# Patient Record
Sex: MALE | Race: WHITE | Employment: FULL TIME | URBAN - METROPOLITAN AREA
[De-identification: names, ages, dates, MRNs, and addresses within clinical notes are randomized per-mention and may not be internally consistent; named-entity substitution may affect disease eponyms.]

---

## 2021-12-15 ENCOUNTER — OFFICE VISIT (OUTPATIENT)
Dept: ENT CLINIC | Age: 36
End: 2021-12-15
Payer: COMMERCIAL

## 2021-12-15 VITALS
HEIGHT: 65 IN | TEMPERATURE: 97.2 F | HEART RATE: 90 BPM | WEIGHT: 94 LBS | SYSTOLIC BLOOD PRESSURE: 130 MMHG | BODY MASS INDEX: 15.66 KG/M2 | DIASTOLIC BLOOD PRESSURE: 82 MMHG

## 2021-12-15 DIAGNOSIS — H83.09 ACUTE VIRAL LABYRINTHITIS, UNSPECIFIED LATERALITY: ICD-10-CM

## 2021-12-15 DIAGNOSIS — H81.319 AUDITORY VERTIGO, UNSPECIFIED LATERALITY: Primary | ICD-10-CM

## 2021-12-15 PROCEDURE — G8484 FLU IMMUNIZE NO ADMIN: HCPCS | Performed by: OTOLARYNGOLOGY

## 2021-12-15 PROCEDURE — 99203 OFFICE O/P NEW LOW 30 MIN: CPT | Performed by: OTOLARYNGOLOGY

## 2021-12-15 PROCEDURE — 1036F TOBACCO NON-USER: CPT | Performed by: OTOLARYNGOLOGY

## 2021-12-15 PROCEDURE — G8419 CALC BMI OUT NRM PARAM NOF/U: HCPCS | Performed by: OTOLARYNGOLOGY

## 2021-12-15 PROCEDURE — G8427 DOCREV CUR MEDS BY ELIG CLIN: HCPCS | Performed by: OTOLARYNGOLOGY

## 2021-12-15 RX ORDER — CEFUROXIME AXETIL 250 MG/1
250 TABLET ORAL 2 TIMES DAILY
COMMUNITY
End: 2021-12-29

## 2021-12-15 RX ORDER — MECLIZINE HYDROCHLORIDE 25 MG/1
25 TABLET ORAL 3 TIMES DAILY PRN
COMMUNITY
End: 2021-12-29

## 2021-12-15 RX ORDER — PROMETHAZINE HYDROCHLORIDE 25 MG/1
25 TABLET ORAL EVERY 6 HOURS PRN
COMMUNITY
End: 2021-12-29

## 2021-12-15 NOTE — PROGRESS NOTES
CHIEF COMPLAINT: Dizziness    HISTORY OF PRESENT ILLNESS:  39 y.o. male who presents with dizziness which started 10 days ago. Woke up with it. True vertigo with nausea and vomiting. A little tinnitus right ear. No hearing loss or fullness. Resolving gradually. Seen in the emergency room and central nervous system disease has been ruled out. PAST MEDICAL HISTORY:   Social History     Tobacco Use   Smoking Status Never Smoker   Smokeless Tobacco Never Used                                                    Social History     Substance and Sexual Activity   Alcohol Use None                                                    Current Outpatient Medications:     meclizine (ANTIVERT) 25 MG tablet, Take 25 mg by mouth 3 times daily as needed, Disp: , Rfl:     cefUROXime (CEFTIN) 250 MG tablet, Take 250 mg by mouth 2 times daily, Disp: , Rfl:     Emtricitabine-Tenofovir AF (DESCOVY PO), Take by mouth, Disp: , Rfl:     promethazine (PHENERGAN) 25 MG tablet, Take 25 mg by mouth every 6 hours as needed for Nausea (Patient not taking: Reported on 12/15/2021), Disp: , Rfl:                                                History reviewed. No pertinent past medical history. History reviewed. No pertinent surgical history. FAMILY HISTORY: Family history reviewed. Except as noted in history of present illness, there is no pertinent family history      REVIEW OF SYSTEMS:  All pertinent positive and negative review of systems included in HPI. Otherwise, all systems are reviewed and negative.     PHYSICAL EXAMINATION:   GENERAL: wdwn- no acute distress  RESPIRATORY:  No stridor or respiratory distress  COMMUNICATION :  Normal voice  MENTAL STATUS:  Mood and affect normal, oriented X 3  HEAD AND FACE:  No abnormalities of the skin of face or head  EXTERNAL EARS AND NOSE:  Normal pinnae bilateral  FACIAL MUSCLES:  All branches of facial nerve intact  EXTRAOCULAR MUSCLES: Intact with full range of motion  FACE PALPATION:  No tenderness over sinuses. Zygomatic arches and orbital rims intact  OTOSCOPY:  Normal external auditory canals, tympanic membranes, and middle ear spaces  TUNING FORKS: Rinne ++ Fernandez midline at 512 Hz  INTRANASAL:  Septum midline, turbinates normal, meati clear. LIPS, TEETH, GINGIVA:  Normal mucosa  PHARYNX:  Normal  NECK:  No masses. LYMPHATIC:  No cervical adenopathy  SALIVARY GLANDS:  No swelling or masses in the parotid or submandibular salivary glands  THYROID:  No goiter or thyroid masses. IMPRESSION: Symptoms are typical of viral labyrinthitis. PLAN: Has been given a prescription for meclizine 25 mg which has been effective in controlling his symptoms. Vies that his symptoms will continue to resolve that he should use meclizine as necessary for symptom control. FOLLOW-UP: As needed.

## 2021-12-15 NOTE — LETTER
[address] [address]  Phone: [phone]  Fax: [phone]    Delfino Vogt MD        December 15, 2021     Patient: Doss Schilder   YOB: 1985   Date of Visit: 12/15/2021       To Whom It May Concern: Doss Schilder was seen in my clinic on 12/15/2021. Please excuse him from work from 12/10/2021 to 12/15/2021. If you have any questions or concerns, please don't hesitate to call.     Sincerely,        Delfino Vogt MD

## 2021-12-29 ENCOUNTER — TELEMEDICINE (OUTPATIENT)
Dept: INTERNAL MEDICINE CLINIC | Age: 36
End: 2021-12-29
Payer: COMMERCIAL

## 2021-12-29 DIAGNOSIS — J06.9 VIRAL URI: Primary | ICD-10-CM

## 2021-12-29 DIAGNOSIS — R31.9 HEMATURIA, UNSPECIFIED TYPE: ICD-10-CM

## 2021-12-29 PROCEDURE — 99203 OFFICE O/P NEW LOW 30 MIN: CPT | Performed by: INTERNAL MEDICINE

## 2021-12-29 PROCEDURE — G8427 DOCREV CUR MEDS BY ELIG CLIN: HCPCS | Performed by: INTERNAL MEDICINE

## 2021-12-29 SDOH — ECONOMIC STABILITY: TRANSPORTATION INSECURITY
IN THE PAST 12 MONTHS, HAS LACK OF TRANSPORTATION KEPT YOU FROM MEETINGS, WORK, OR FROM GETTING THINGS NEEDED FOR DAILY LIVING?: NO

## 2021-12-29 SDOH — ECONOMIC STABILITY: HOUSING INSECURITY
IN THE LAST 12 MONTHS, WAS THERE A TIME WHEN YOU DID NOT HAVE A STEADY PLACE TO SLEEP OR SLEPT IN A SHELTER (INCLUDING NOW)?: NO

## 2021-12-29 SDOH — HEALTH STABILITY: PHYSICAL HEALTH: ON AVERAGE, HOW MANY MINUTES DO YOU ENGAGE IN EXERCISE AT THIS LEVEL?: 0 MIN

## 2021-12-29 SDOH — ECONOMIC STABILITY: FOOD INSECURITY: WITHIN THE PAST 12 MONTHS, THE FOOD YOU BOUGHT JUST DIDN'T LAST AND YOU DIDN'T HAVE MONEY TO GET MORE.: NEVER TRUE

## 2021-12-29 SDOH — ECONOMIC STABILITY: TRANSPORTATION INSECURITY
IN THE PAST 12 MONTHS, HAS THE LACK OF TRANSPORTATION KEPT YOU FROM MEDICAL APPOINTMENTS OR FROM GETTING MEDICATIONS?: NO

## 2021-12-29 SDOH — ECONOMIC STABILITY: INCOME INSECURITY: IN THE LAST 12 MONTHS, WAS THERE A TIME WHEN YOU WERE NOT ABLE TO PAY THE MORTGAGE OR RENT ON TIME?: NO

## 2021-12-29 SDOH — ECONOMIC STABILITY: FOOD INSECURITY: WITHIN THE PAST 12 MONTHS, YOU WORRIED THAT YOUR FOOD WOULD RUN OUT BEFORE YOU GOT MONEY TO BUY MORE.: NEVER TRUE

## 2021-12-29 SDOH — HEALTH STABILITY: PHYSICAL HEALTH: ON AVERAGE, HOW MANY DAYS PER WEEK DO YOU ENGAGE IN MODERATE TO STRENUOUS EXERCISE (LIKE A BRISK WALK)?: 0 DAYS

## 2021-12-29 ASSESSMENT — SOCIAL DETERMINANTS OF HEALTH (SDOH)
WITHIN THE LAST YEAR, HAVE YOU BEEN AFRAID OF YOUR PARTNER OR EX-PARTNER?: NO
DO YOU BELONG TO ANY CLUBS OR ORGANIZATIONS SUCH AS CHURCH GROUPS UNIONS, FRATERNAL OR ATHLETIC GROUPS, OR SCHOOL GROUPS?: NO
IN A TYPICAL WEEK, HOW MANY TIMES DO YOU TALK ON THE PHONE WITH FAMILY, FRIENDS, OR NEIGHBORS?: THREE TIMES A WEEK
HOW OFTEN DO YOU GET TOGETHER WITH FRIENDS OR RELATIVES?: THREE TIMES A WEEK
WITHIN THE LAST YEAR, HAVE TO BEEN RAPED OR FORCED TO HAVE ANY KIND OF SEXUAL ACTIVITY BY YOUR PARTNER OR EX-PARTNER?: NO
WITHIN THE LAST YEAR, HAVE YOU BEEN KICKED, HIT, SLAPPED, OR OTHERWISE PHYSICALLY HURT BY YOUR PARTNER OR EX-PARTNER?: NO
HOW HARD IS IT FOR YOU TO PAY FOR THE VERY BASICS LIKE FOOD, HOUSING, MEDICAL CARE, AND HEATING?: NOT HARD AT ALL
WITHIN THE LAST YEAR, HAVE YOU BEEN HUMILIATED OR EMOTIONALLY ABUSED IN OTHER WAYS BY YOUR PARTNER OR EX-PARTNER?: NO
HOW OFTEN DO YOU ATTENT MEETINGS OF THE CLUB OR ORGANIZATION YOU BELONG TO?: NEVER
HOW OFTEN DO YOU ATTEND CHURCH OR RELIGIOUS SERVICES?: NEVER

## 2021-12-29 ASSESSMENT — LIFESTYLE VARIABLES: HOW OFTEN DO YOU HAVE A DRINK CONTAINING ALCOHOL: NEVER

## 2021-12-29 ASSESSMENT — PATIENT HEALTH QUESTIONNAIRE - PHQ9
SUM OF ALL RESPONSES TO PHQ QUESTIONS 1-9: 1
SUM OF ALL RESPONSES TO PHQ9 QUESTIONS 1 & 2: 1
2. FEELING DOWN, DEPRESSED OR HOPELESS: 1
SUM OF ALL RESPONSES TO PHQ QUESTIONS 1-9: 1
SUM OF ALL RESPONSES TO PHQ QUESTIONS 1-9: 1
1. LITTLE INTEREST OR PLEASURE IN DOING THINGS: 0

## 2021-12-29 NOTE — PROGRESS NOTES
Haritha Deshpande (:  1985) is a 39 y.o. male,New patient, here for evaluation of the following chief complaint(s): New Patient (persistant headache and sore throat. Patient was just in the ED for vertigo and urine issues. Also said that he has high sugar (138). )         ASSESSMENT/PLAN:  1. Viral URI  Patient with ongoing symptoms of viral URI with Covid exposure. Patient did have a Covid test yesterday which was negative. Patient to continue good mask wearing. Can continue over-the-counter medications for symptom management including Tylenol and ibuprofen. Patient to notify me if symptoms do not completely resolved. 2. Hematuria, unspecified type  Noted on lab work in the ER in mid December. Patient may have had a kidney stone at that time.    -Will check UA when patient is seen in the office in a couple of weeks-  -Referral given to urology  -     Irvin More MD, Urology, [place]  3.  Elevated blood sugar  Patient notes an elevated blood sugar when in the ER while fasting. Patient would like evaluation of this further.  - will plan on CMP and hemoglobin A1c when patient is seen in the office in 2 weeks  Return in about 2 weeks (around 2022), or annual physical.       SUBJECTIVE/OBJECTIVE:  HPI     Patient is a 43-year-old male with no significant past medical history who presents secondary to headache sore throat nasal congestion as well as follow-up from an ER visit. Patient is a new patient and will require a full new patient visit however secondary to his symptoms we will see him for acute issues at this time. Patient has he was exposed to Covid on the . He went down to [place] following this and noticed symptoms 4 to 5 days ago. He notes he has a headache sore throat nasal congestion. He denies any cough or fevers. He completed a rapid test yesterday at urgent care which was negative. His symptoms are not severe.   He notes that the most significant symptom is actually fatigue. He was not tested for flu at urgent care. He has not had the COVID-19 vaccine or the flu vaccine. Patient also notes that he was recently in the ER twice on December 6 and December 11. He originally went there secondary to vertigo. He saw ENT following this visit. He notes that symptoms of vertigo have improved significantly. During both ER visits he notes that he was also found to initially possibly have a UTI. He was given antibiotics at his first ER visit. He went back to the ER the second time secondary to passing a large blood clot through the urethra. Urine at that time did show blood. It was thought that he could possibly have had a kidney stone that he passed. Patient notes that his urine is dark however he cannot see any blood currently. Review of Systems ROS negative except for those noted in the HPI above.        Patient-Reported Vitals 12/29/2021   Patient-Reported Weight 94   Patient-Reported Height 5'5\"      Physical Exam    [INSTRUCTIONS:  \"[x]\" Indicates a positive item  \"[]\" Indicates a negative item  -- DELETE ALL ITEMS NOT EXAMINED]    Constitutional: [x] Appears well-developed and well-nourished [x] No apparent distress      [] Abnormal -     Mental status: [x] Alert and awake  [x] Oriented to person/place/time [x] Able to follow commands    [] Abnormal -     Eyes:   EOM    [x]  Normal    [] Abnormal -   Sclera  [x]  Normal    [] Abnormal -          Discharge [x]  None visible   [] Abnormal -     HENT: [x] Normocephalic, atraumatic  [] Abnormal -   [x] Mouth/Throat: Mucous membranes are moist    External Ears [x] Normal  [] Abnormal -    Neck: [x] No visualized mass [] Abnormal -     Pulmonary/Chest: [x] Respiratory effort normal   [x] No visualized signs of difficulty breathing or respiratory distress        [] Abnormal -      Musculoskeletal:   [] Normal gait with no signs of ataxia         [x] Normal range of motion of neck        [] Abnormal -

## 2021-12-29 NOTE — PATIENT INSTRUCTIONS
[facility]  [address] [facility]  Phone: [phone]  Fax: [phone]

## 2022-01-13 ENCOUNTER — TELEPHONE (OUTPATIENT)
Dept: INTERNAL MEDICINE CLINIC | Age: 37
End: 2022-01-13

## 2022-02-07 LAB
ALBUMIN SERPL-MCNC: 5 G/DL
ALP BLD-CCNC: 91 U/L
ALT SERPL-CCNC: 14 U/L
ANION GAP SERPL CALCULATED.3IONS-SCNC: NORMAL MMOL/L
ANTIGEN INTERPRETATION: NEGATIVE
AST SERPL-CCNC: 18 U/L
BASOPHILS ABSOLUTE: 0 /ΜL
BASOPHILS RELATIVE PERCENT: 0 %
BILIRUB SERPL-MCNC: 0.4 MG/DL (ref 0.1–1.4)
BUN BLDV-MCNC: 7 MG/DL
CALCIUM SERPL-MCNC: 9.7 MG/DL
CHLORIDE BLD-SCNC: 104 MMOL/L
CO2: 23 MMOL/L
CREAT SERPL-MCNC: 0.88 MG/DL
EOSINOPHILS ABSOLUTE: 0.1 /ΜL
EOSINOPHILS RELATIVE PERCENT: 3 %
GFR CALCULATED: NORMAL
GLUCOSE BLD-MCNC: 96 MG/DL
HCT VFR BLD CALC: 39 % (ref 41–53)
HEMOGLOBIN: 13.6 G/DL (ref 13.5–17.5)
HIV AG/AB: NONREACTIVE
LYMPHOCYTES ABSOLUTE: 2.8 /ΜL
LYMPHOCYTES RELATIVE PERCENT: 61 %
MCH RBC QN AUTO: 34.1 PG
MCHC RBC AUTO-ENTMCNC: 34.9 G/DL
MCV RBC AUTO: 98 FL
MONOCYTES ABSOLUTE: 0.6 /ΜL
MONOCYTES RELATIVE PERCENT: 12 %
NEUTROPHILS ABSOLUTE: 1.1 /ΜL
NEUTROPHILS RELATIVE PERCENT: 24 %
PLATELET # BLD: 176 K/ΜL
PMV BLD AUTO: ABNORMAL FL
POTASSIUM SERPL-SCNC: 4.1 MMOL/L
RBC # BLD: 3.99 10^6/ΜL
SODIUM BLD-SCNC: 143 MMOL/L
TOTAL PROTEIN: 7.2
WBC # BLD: 4.6 10^3/ML

## 2022-02-09 ENCOUNTER — OFFICE VISIT (OUTPATIENT)
Dept: INTERNAL MEDICINE CLINIC | Age: 37
End: 2022-02-09
Payer: COMMERCIAL

## 2022-02-09 VITALS
BODY MASS INDEX: 16.16 KG/M2 | WEIGHT: 97 LBS | DIASTOLIC BLOOD PRESSURE: 64 MMHG | SYSTOLIC BLOOD PRESSURE: 104 MMHG | HEIGHT: 65 IN | HEART RATE: 74 BPM | OXYGEN SATURATION: 96 %

## 2022-02-09 DIAGNOSIS — Z00.00 ANNUAL PHYSICAL EXAM: Primary | ICD-10-CM

## 2022-02-09 DIAGNOSIS — F33.1 MODERATE EPISODE OF RECURRENT MAJOR DEPRESSIVE DISORDER (HCC): ICD-10-CM

## 2022-02-09 DIAGNOSIS — R63.6 UNDERWEIGHT: ICD-10-CM

## 2022-02-09 DIAGNOSIS — R19.7 DIARRHEA, UNSPECIFIED TYPE: ICD-10-CM

## 2022-02-09 DIAGNOSIS — K40.20 NON-RECURRENT BILATERAL INGUINAL HERNIA WITHOUT OBSTRUCTION OR GANGRENE: ICD-10-CM

## 2022-02-09 DIAGNOSIS — R63.4 WEIGHT LOSS: ICD-10-CM

## 2022-02-09 DIAGNOSIS — F41.9 ANXIETY: ICD-10-CM

## 2022-02-09 DIAGNOSIS — R73.9 ELEVATED BLOOD SUGAR: ICD-10-CM

## 2022-02-09 PROCEDURE — 99395 PREV VISIT EST AGE 18-39: CPT | Performed by: INTERNAL MEDICINE

## 2022-02-09 RX ORDER — ESCITALOPRAM OXALATE 5 MG/1
5 TABLET ORAL DAILY
Qty: 30 TABLET | Refills: 0 | Status: SHIPPED | OUTPATIENT
Start: 2022-02-09 | End: 2022-06-27

## 2022-02-09 NOTE — PROGRESS NOTES
Nanci Christie (:  1985) is a 39 y.o. male,Established patient, here for evaluation of the following chief complaint(s): Annual Exam and Depression (Feels emotional support dog would help)      Vitals:    22 1044   BP: 104/64   Pulse: 74   SpO2: 96%        ASSESSMENT/PLAN:  1. Annual physical exam  Tetanus shot: will call to schedule  Covid-19 vaccine: education given  HIV: [facility]  Hep c: lab [facility]    Seat Belt use: yes  Smoke and carbon monoxide detectors within the home: yes  Clyman Cornet screen use daily: No, education given   Diet: no longer vegan, but is maintaining a plant based diet. Going to eat meat again soon secondary to low weights. Stays away from fried foods. Does get fruit and veggies  Exercise : not doing any at this time    2. Non-recurrent bilateral inguinal hernia without obstruction or gangrene  Seen on exam today. Referral given to general surgery for further evaluation.  -     Dave Velasco MD, General Surgery, [facility]  3. Elevated blood sugar  Patient with elevated blood sugar while fasting on prior labs. Patient concerned about diabetes. Check hemoglobin A1c  -     Hemoglobin A1C; Future  4. Weight loss  Patient has had significant weight loss and BMI is currently 16.14. Patient has noted that this has been an issue for some time. Check TSH in addition to obtaining labs from [facility] that were done recently. These included a CMP, CBC, HIV and hepatitis C. Patient also notes diarrhea following eating. Patient had work-up by GI in the past including screening for celiac disease and IBD. Will give referral back to GI for further evaluation. Patient did not have a clean colonoscopy most recently. Some polyps were still identified during that colonoscopy. Patient also scheduled with dietitian  -     TSH with Reflex; Future  -     [facility] - Jeremi Campbell MD, Gastroenterology, [facility]  5. Diarrhea, unspecified type  See #4 above  6.  Anxiety  Patient has worsening anxiety and depression more recently around the death of his grandmother. Patient would like to have an emotional support dog. Letter written for patient to take to his landlord. Patient does not want to pursue medication at this time. Plan to follow-up in 4 to 6 weeks when patient has insurance. 7. Moderate episode of recurrent major depressive disorder ([facility] [address]  See #6 above  8. Underweight  See #4 above    Return in about 4 weeks (around 3/9/2022). SUBJECTIVE/OBJECTIVE:  HPI    Annual physical    Tetanus shot: will call to schedule  Covid-19 vaccine: education given  HIV: [facility]  Hep c: lab core    Seat Belt use: yes  Smoke and carbon monoxide detectors within the home: yes  [employer] screen use daily: No, education given   Diet: no longer vegan, but is maintaining a plant based diet. Going to eat meat again soon secondary to low weights. Stays away from fried foods. Does get fruit and veggies  Exercise : not doing any at this time    Patient has had is having a lot of anxiety and depression. He has had this in the past and has been on medications in the past.  Patient notes that stress has increased most recently secondary to the death of his grandmother. He also notes financial issues. He is also in a new city without any family or many friends. He notes difficulty making friends. Patient would like an emotional support animal with possible. Patient denies any suicidal ideation or self-harm thoughts. Patient also notes that he had labs completed by his infectious disease doctor who prescribes him prep by [facility].  Patient shows me results for an HIV and hepatitis C test as well as CMP and CBC. Will obtain these labs. Patient also notes that he has dealt with low weight for some time. He states that he seen GI in the past with multiple colonoscopies. Most recent colonoscopy was not a good prep. There were polyps that were found.   Patient has had work-up for celiac disease that he states was negative. He notes that he has no problem eating however every time he eats he notes diarrhea. Denies any blood or melena. Has not seen GI recently. Patient also notes inguinal hernias in the bilateral groin areas. He notes the right side does give him discomfort. Review of Systems ROS negative except for those noted in the HPI above. Vitals:    02/09/22 1044   BP: 104/64   Pulse: 74   SpO2: 96%     Physical Exam  Constitutional:       General: He is not in acute distress. Appearance: Normal appearance. He is not ill-appearing. HENT:      Head: Normocephalic and atraumatic. Eyes:      General: No scleral icterus. Extraocular Movements: Extraocular movements intact. Conjunctiva/sclera: Conjunctivae normal.   Cardiovascular:      Rate and Rhythm: Normal rate and regular rhythm. Pulses: Normal pulses. Heart sounds: No murmur heard. No friction rub. No gallop. Pulmonary:      Effort: Pulmonary effort is normal. No respiratory distress. Breath sounds: No wheezing, rhonchi or rales. Abdominal:      General: Bowel sounds are normal. There is no distension. Palpations: Abdomen is soft. Tenderness: There is no abdominal tenderness. There is no guarding or rebound. Genitourinary:     Comments: Inguinal hernias palpated bilaterally  Musculoskeletal:      Cervical back: Normal range of motion. No muscular tenderness. Right lower leg: No edema. Left lower leg: No edema. Lymphadenopathy:      Cervical: No cervical adenopathy. Skin:     General: Skin is warm. Findings: No erythema or rash. Neurological:      General: No focal deficit present. Mental Status: He is alert and oriented to person, place, and time. Psychiatric:         Mood and Affect: Mood normal.         Behavior: Behavior normal.           An electronic signature was used to authenticate this note.     --Aly Shah DO

## 2022-02-09 NOTE — LETTER
To whom it may concern,      It is in my medical opinion that my patient, Cole Parker, would benefit from an emotional support animal such as a dog. If there are any questions please feel free to reach out to me.         Ada Tatum, DO  Internal Medicine  [facility]  Q:[phone]

## 2022-02-10 PROBLEM — F41.9 ANXIETY: Status: ACTIVE | Noted: 2022-02-10

## 2022-02-10 PROBLEM — F33.1 MODERATE EPISODE OF RECURRENT MAJOR DEPRESSIVE DISORDER (HCC): Status: ACTIVE | Noted: 2022-02-10

## 2022-02-10 PROBLEM — R63.6 UNDERWEIGHT: Status: ACTIVE | Noted: 2022-02-10

## 2022-02-22 ENCOUNTER — TELEPHONE (OUTPATIENT)
Dept: SURGERY | Age: 37
End: 2022-02-22

## 2022-02-22 NOTE — TELEPHONE ENCOUNTER
Left message for patient to call back to schedule K40.20 (ICD-10-CM) - Non-recurrent bilateral inguinal hernia without obstruction or gangrene    Referral work que

## 2022-05-02 ENCOUNTER — TELEPHONE (OUTPATIENT)
Dept: INTERNAL MEDICINE CLINIC | Age: 37
End: 2022-05-02

## 2022-05-02 LAB
ALBUMIN SERPL-MCNC: 4.7 G/DL
ALP BLD-CCNC: 85 U/L
ALT SERPL-CCNC: 13 U/L
ANION GAP SERPL CALCULATED.3IONS-SCNC: NORMAL MMOL/L
ANTIGEN INTERPRETATION: NEGATIVE
AST SERPL-CCNC: 15 U/L
BASOPHILS ABSOLUTE: 0 /ΜL
BASOPHILS RELATIVE PERCENT: 1 %
BILIRUB SERPL-MCNC: 0.6 MG/DL (ref 0.1–1.4)
BUN BLDV-MCNC: 13 MG/DL
CALCIUM SERPL-MCNC: 9.4 MG/DL
CHLORIDE BLD-SCNC: 101 MMOL/L
CO2: 24 MMOL/L
CREAT SERPL-MCNC: 0.96 MG/DL
EOSINOPHILS ABSOLUTE: 0.1 /ΜL
EOSINOPHILS RELATIVE PERCENT: 3 %
GFR CALCULATED: NORMAL
GLUCOSE BLD-MCNC: 98 MG/DL
HCT VFR BLD CALC: 42.6 % (ref 41–53)
HEMOGLOBIN: 14.7 G/DL (ref 13.5–17.5)
HIV AG/AB: NONREACTIVE
LYMPHOCYTES ABSOLUTE: 2.4 /ΜL
LYMPHOCYTES RELATIVE PERCENT: 57 %
MCH RBC QN AUTO: 34.2 PG
MCHC RBC AUTO-ENTMCNC: 34.5 G/DL
MCV RBC AUTO: 99 FL
MONOCYTES ABSOLUTE: 0.5 /ΜL
MONOCYTES RELATIVE PERCENT: 11 %
NEUTROPHILS ABSOLUTE: 1.2 /ΜL
NEUTROPHILS RELATIVE PERCENT: 28 %
PLATELET # BLD: 181 K/ΜL
PMV BLD AUTO: NORMAL FL
POTASSIUM SERPL-SCNC: 3.9 MMOL/L
RBC # BLD: 4.3 10^6/ΜL
RPR TITER: NORMAL
RPR: REACTIVE
SODIUM BLD-SCNC: 140 MMOL/L
TOTAL PROTEIN: 7.1
WBC # BLD: 4.3 10^3/ML

## 2022-05-02 NOTE — TELEPHONE ENCOUNTER
----- Message from David Holland sent at 5/2/2022  4:28 PM EDT -----  Subject: Message to Provider    QUESTIONS  Information for Provider? PT called requesting Copy of referral sent to   Dr. Whitney Ortiz of [facility]. Fax# [phone]. Also wanted to let Dr. Norma Quiñones know talked to Dr. Leonel Grant about procedure in office, but due to   insurance, they are getting a quote and calling him back. GI appt, Dr. Garry King, & Surgeon appt, Dr. Shyla Rahman, are both scheduled for 5/16.  ---------------------------------------------------------------------------  --------------  CALL BACK INFO  What is the best way for the office to contact you? OK to leave message on   voicemail  Preferred Call Back Phone Number? [phone]  ---------------------------------------------------------------------------  --------------  SCRIPT ANSWERS  Relationship to Patient?  Self

## 2022-05-16 ENCOUNTER — OFFICE VISIT (OUTPATIENT)
Dept: SURGERY | Age: 37
End: 2022-05-16
Payer: COMMERCIAL

## 2022-05-16 VITALS
HEART RATE: 81 BPM | HEIGHT: 65 IN | WEIGHT: 98.8 LBS | SYSTOLIC BLOOD PRESSURE: 101 MMHG | DIASTOLIC BLOOD PRESSURE: 67 MMHG | BODY MASS INDEX: 16.46 KG/M2 | TEMPERATURE: 98.4 F

## 2022-05-16 DIAGNOSIS — K40.20 NON-RECURRENT BILATERAL INGUINAL HERNIA WITHOUT OBSTRUCTION OR GANGRENE: Primary | ICD-10-CM

## 2022-05-16 PROCEDURE — 99204 OFFICE O/P NEW MOD 45 MIN: CPT | Performed by: SURGERY

## 2022-05-16 NOTE — PROGRESS NOTES
Department of General Surgery - Adult  General Surgery Service  Resident Clinic Note      CC: Bilateral Inguinal Hernia    History Obtained From:  patient, electronic medical record    HISTORY OF PRESENT ILLNESS:  This is a 39 y.o. male with Hx as below who presents for evaluation of inguinal hernias. He states that they have been present for at least the past year. They do cause him some discomfort on a daily basis. He denies episodes of incarceration and denies obstructive symptoms. Surgical history is significant for left orchiectomy for undescended testicle at age 15. Patient is currently undergoing work up for hematuria and is scheduled for a pyelogram with urology in the upcoming weeks. He endorses a strong family history of colon cancer and has had polyps removed on previous colonoscopies. No past medical history on file. No past surgical history on file. Current Outpatient Medications   Medication Sig Dispense Refill    Probiotic Product (PROBIOTIC PO) Take by mouth daily      VITAMIN D PO Take by mouth daily      Omega-3 Fatty Acids (OMEGA 3 PO) Take by mouth daily      escitalopram (LEXAPRO) 5 MG tablet Take 1 tablet by mouth daily 30 tablet 0    Emtricitabine-Tenofovir AF (DESCOVY PO) Take by mouth       No current facility-administered medications for this visit. No Known Allergies  Social History     Tobacco Use    Smoking status: Never Smoker    Smokeless tobacco: Never Used   Vaping Use    Vaping Use: Never used   Substance Use Topics    Alcohol use: Not Currently    Drug use: Not Currently     No family history on file. REVIEW OF SYSTEMS:    A 14 point review of systems was conducted, significant findings as noted in HPI. All other systems negative. PHYSICAL EXAM:    There were no vitals filed for this visit.     General appearance: thin male, sitting in chair, in NAD  Eyes: EOMI, no scleral icterus  Neck: trachea midline, no JVD  Chest/Lungs: normal effort, no adventitious breath sounds, on RA  Cardiovascular: RRR  Abdomen: soft, non-tender, non-distended, +BS, no guarding/rigidity  : bilateral inguinal hernias noted, easily reducible, absent left testicle  Skin: warm and dry, no rashes  Extremities: no edema, no cyanosis  Neuro: A&Ox3, no focal deficits, sensation intact    ASSESSMENT AND PLAN:  This is a 39 y.o. male who presents with bilateral symptomatic inguinal hernias    - Given symptomatic nature and size of hernias, the patient was offered surgical repair, specifically laparoscopic pre-peritoneal repair with mesh  - after discussion of indications, risks, benefits, and alternatives to surgery , the patient wishes to proceed  - plan for OR in the near future, patient will call to schedule      Ciera Santana DO  05/16/22  9:36 AM

## 2022-05-16 NOTE — LETTER
[facility] Sur[facility] of Kareen Champion M.D. [facility]  [id number]  [address] [facility]. [address]  Phone: [phone] Fax: [phone]            Surgery Order/Time:  22/2:45 PM  Conf. # _________________  Scheduled by: Surinder Lobo Lpn                                **Pre-Surgical Orders in [facility]**  Facility: [facility].    Surgery Date: 2022 Time: 730am     Pt arrival: [place]  Second Surgeon: N/A        Patient Name: Corinne Clarity  : 1985  Home Ph:[phone] (home)  Address:[address] [place] [address]  PCP:  Blue Tovar, DO   Does patient speak English?: yes    needed? no                                             PROCEDURE: Laparoscopic bilateral inguinal hernia repair  CPT: 93165: Laparoscopic Inguinal Hernia Repair    DIAGNOSIS:      ICD-10-CM    1. Non-recurrent bilateral inguinal hernia without obstruction or gangrene  K40.20        Anesthesia: General  Time Needed:  1 hour   Pt Position:  supine      Outpatient __x__ Admit ______  Assist._____   Cardiac Clearance: no  Patient to meet with Anesthesiology prior to surgery: no    Patient Allergies: No Known Allergies  Pre-Op H&P to be done by: Blue Tovar DO  Pre-Op Antibiotics: Ancef: 2g IV On Call to OR      Physician: Noelle Olson MD Date: 22             Insurance:     ID #      Ph #   Date called ________________   Laina Agosto to: _____________ Precert Needed?  Yes  /  No  PreAuth # & Details   ______________________________________________________________________

## 2022-05-17 ENCOUNTER — TELEPHONE (OUTPATIENT)
Dept: SURGERY | Age: 37
End: 2022-05-17

## 2022-05-17 NOTE — TELEPHONE ENCOUNTER
Patient stated that MD wanted him to schedule surgery after his cystoscopy on 6/14/22. Patient stated he would call to schedule after that was completed.

## 2022-05-31 LAB
BASOPHILS ABSOLUTE: 0 /ΜL
BASOPHILS RELATIVE PERCENT: 1 %
BUN BLDV-MCNC: 9 MG/DL
CALCIUM SERPL-MCNC: 9.4 MG/DL
CHLORIDE BLD-SCNC: 103 MMOL/L
CO2: 23 MMOL/L
CREAT SERPL-MCNC: 0.88 MG/DL
EOSINOPHILS ABSOLUTE: 0.1 /ΜL
EOSINOPHILS RELATIVE PERCENT: 2 %
GFR CALCULATED: NORMAL
GLUCOSE BLD-MCNC: 94 MG/DL
HCT VFR BLD CALC: 43.6 % (ref 41–53)
HEMOGLOBIN: 14.9 G/DL (ref 13.5–17.5)
LYMPHOCYTES ABSOLUTE: 2.1 /ΜL
LYMPHOCYTES RELATIVE PERCENT: 59 %
MCH RBC QN AUTO: 33.8 PG
MCHC RBC AUTO-ENTMCNC: 34.2 G/DL
MCV RBC AUTO: 99 FL
MONOCYTES ABSOLUTE: 0.4 /ΜL
MONOCYTES RELATIVE PERCENT: 10 %
NEUTROPHILS ABSOLUTE: 1 /ΜL
NEUTROPHILS RELATIVE PERCENT: 28 %
PLATELET # BLD: 181 K/ΜL
PMV BLD AUTO: NORMAL FL
POTASSIUM SERPL-SCNC: 4.2 MMOL/L
RBC # BLD: 4.41 10^6/ΜL
SODIUM BLD-SCNC: 141 MMOL/L
WBC # BLD: 3.6 10^3/ML

## 2022-06-02 LAB
BASOPHILS ABSOLUTE: 0 /ΜL
BASOPHILS RELATIVE PERCENT: 1 %
BUN BLDV-MCNC: 9 MG/DL
CALCIUM SERPL-MCNC: 9.4 MG/DL
CHLORIDE BLD-SCNC: 103 MMOL/L
CO2: 23 MMOL/L
CREAT SERPL-MCNC: 0.88 MG/DL
EOSINOPHILS ABSOLUTE: 0.1 /ΜL
EOSINOPHILS RELATIVE PERCENT: 2 %
GFR CALCULATED: 114
GLUCOSE BLD-MCNC: 94 MG/DL
HCT VFR BLD CALC: 43.6 % (ref 41–53)
HEMOGLOBIN: 14.9 G/DL (ref 13.5–17.5)
LYMPHOCYTES ABSOLUTE: 2.1 /ΜL
LYMPHOCYTES RELATIVE PERCENT: 59 %
MCH RBC QN AUTO: 33.8 PG
MCHC RBC AUTO-ENTMCNC: 34.2 G/DL
MCV RBC AUTO: 99 FL
MONOCYTES ABSOLUTE: 0.4 /ΜL
MONOCYTES RELATIVE PERCENT: 10 %
NEUTROPHILS ABSOLUTE: 1 /ΜL
NEUTROPHILS RELATIVE PERCENT: 28 %
PLATELET # BLD: 181 K/ΜL
PMV BLD AUTO: NORMAL FL
POTASSIUM SERPL-SCNC: 4.2 MMOL/L
RBC # BLD: 4.41 10^6/ΜL
SODIUM BLD-SCNC: 141 MMOL/L
WBC # BLD: 3.6 10^3/ML

## 2022-06-10 NOTE — PROGRESS NOTES
[facility] PRE-SURGICAL TESTING INSTRUCTIONS                                  PRIOR TO PROCEDURE DATE:        1. PLEASE FOLLOW ANY  GUIDELINES/ INSTRUCTIONS PRIOR TO YOUR PROCEDURE AS ADVISED BY YOUR SURGEON. 2. Arrange for someone to drive you home and be with you for the first 24 hours after discharge for your safety after your procedure for which you received sedation. Ensure it is someone we can share information with regarding your discharge. 3. You must contact your surgeon for instructions IF:   You are taking any blood thinners, aspirin, anti-inflammatory or vitamin E.   There is a change in your physical condition such as a cold, fever, rash, cuts, sores or any other infection, especially near your surgical site. 4. Do not drink alcohol the day before or day of your procedure. 5. A Pre-op History and Physical for surgery MUST be completed by your Physician or Urgent Care within 30 days of your procedure date. Please bring a copy with you on the day of your procedure and along with any other testing performed. THE DAY OF YOUR PROCEDURE:  1. Follow instructions for ARRIVAL TIME as DIRECTED BY YOUR SURGEON. 2. Enter the MAIN entrance from [employer] and follow the signs to the free [place] or [employer] parking (offered free of charge 6am-5pm). 3. Enter the Main Entrance of the hospital (do not enter from the lower level of the parking garage). Upon entrance, check in with the  at the main desk on your left. If no one is available at the desk, proceed into the [facility] Waiting Room and go through the door directly into the [facility]. There is a Check-in desk ACROSS from Room 5 (marked with a sign hanging from the ceiling). The phone number for the surgery center is [phone]. 4. Please call [phone] option #2 option #2 if you have not been preregistered yet.   On the day of your procedure bring your insurance card and photo ID. You will be registered at your bedside once brought back to your room. 5. DO NOT EAT ANYTHING eight hours prior to your arrival for surgery. May have 8 ounces of water 4 hours prior to your arrival for surgery. NOTE: ALL Gastric, Bariatric and Bowel surgery patients MUST follow their surgeon's instructions. 6. MEDICATIONS    Take the following medications with a SMALL sip of water:    Bariatric patient's call surgeon if on diabetic medications as some need to be stopped 1 week preop   Use your usual dose of inhalers the morning of surgery. BRING your rescue inhaler with you to hospital.    Anesthesia does NOT want you to take insulin the morning of surgery. They will control your blood sugar while you are at the hospital. Please contact your ordering physician for instructions regarding your insulin the night before your procedure. If you have an insulin pump, please keep it set on basal rate. 7. Do not swallow water when brushing teeth. No gum, candy, mints or ice chips. Refrain from smoking or at least decrease the amount. 8. Dress in loose, comfortable clothing appropriate for redressing after your procedure. Do not wear jewelry (including body piercings), make-up (especially NO eye make-up), fingernail polish (NO toenail polish if foot/leg surgery), lotion, powders or metal hairclips. 9. Dentures, glasses, or contacts will need to be removed before your procedure. Bring cases for your glasses, contacts, dentures, or hearing aids to protect them while you are in surgery. 10. If you use a CPAP, please bring it with you on the day of your procedure. 11. We recommend that valuable personal  belongings such as cash, cell phones, e-tablets or jewelry, be left at home during your stay. The hospital will not be responsible for valuables that are not secured in the hospital safe.  However, if your insurance requires a co-pay, you may want to bring a method of payment, i.e. Check or credit card, if you wish to pay your co-pay the day of surgery. 12. If you are to stay overnight, you may bring a bag with personal items. Please have any large items you may need brought in by your family after your arrival to your hospital room. 15. If you have a Living Will or Durable Power of , please bring a copy on the day of your procedure. 15. With your permission, one family member may accompany you while you are being prepared for surgery. Once you are ready, additional family members may join you. HOW WE KEEP YOU SAFE and WORK TO PREVENT SURGICAL SITE INFECTIONS:  1. Health care workers should always check your ID bracelet to verify your name and birth date. You will be asked many times to state your name, date of birth, and allergies. 2. Health care workers should always clean their hands with soap or alcohol gel before providing care to you. It is okay to ask anyone if they cleaned their hands before they touch you. 3. You will be actively involved in verifying the type of procedure you are having and ensuring the correct surgical site. This will be confirmed multiple times prior to your procedure. Do NOT jose your surgery site UNLESS instructed to by your surgeon. 4. Do not shave or wax for 72 hours prior to procedure near your operative site. Shaving with a razor can irritate your skin and make it easier to develop an infection. On the day of your procedure, any hair that needs to be removed near the surgical site will be clipped by a healthcare worker using a special clippers designed to avoid skin irritation. 5. When you are in the operating room, your surgical site will be cleansed with a special soap, and in most cases, you will be given an antibiotic before the surgery begins. What to expect AFTER YOUR PROCEDURE:  1. Immediately following your procedure, your will be taken to the PACU for the first phase of your recovery.   Your nurse will help you recover from any potential side effects of anesthesia, such as extreme drowsiness, changes in your vital signs or breathing patterns. Nausea, headache, muscle aches, or sore throat may also occur after anesthesia. Your nurse will help you manage these potential side effects. 2. For comfort and safety, arrange to have someone at home with you for the first 24 hours after discharge. 3. You and your family will be given written instructions about your diet, activity, dressing care, medications, and return visits. 4. Once at home, should issues with nausea, pain, or bleeding occur, or should you notice any signs of infection, you should call your surgeon. 5. Always clean your hands before and after caring for your wound. Do not let your family touch your surgery site without cleaning their hands. 6. Narcotic pain medications can cause significant constipation. You may want to add a stool softener to your postoperative medication schedule or speak to your surgeon on how best to manage this SIDE EFFECT. SPECIAL INSTRUCTIONS     Thank you for allowing us to care for you. We strive to exceed your expectations in the delivery of care and service provided to you and your family. If you need to contact the [place] [address] staff for any reason, please call us at [phone]    Instructions reviewed with patient during preadmission testing phone interview.   Mandy Jimenez RN.6/10/2022 .3:39 PM      ADDITIONAL EDUCATIONAL INFORMATION REVIEWED PER PHONE WITH YOU AND/OR YOUR FAMILY:  No Hibiclens® Bathing Instructions   Yes Antibacterial Soap

## 2022-06-10 NOTE — PROGRESS NOTES
Place patient label inside box (if no patient label, complete below)  Name:  :  MR#:               Lea Alegre / PROCEDURE  1. I (we), Fabian Bahena  (Patient Name) authorize DR. Kevin Tamayo  (Provider / [facility]) and/or such assistants as may be selected by him/her, to perform the following operation/procedure(s): CYSTOSCOPY, BILATERAL RETROGRADE PYELOGRAM       Note: If unable to obtain consent prior to an emergent procedure, document the emergent reason in the medical record. This procedure has been explained to my (our) satisfaction and included in the explanation was:  A) The intended benefit, nature, and extent of the procedure to be performed;  B) The significant risks involved and the probability of success;  C) Alternative procedures and methods of treatment;  D) The dangers and probable consequences of such alternatives (including no procedure or treatment); E) The expected consequences of the procedure on my future health;  F) Whether other qualified individuals would be performing important surgical tasks and/or whether  would be present to advise or support the procedure. I (we) understand that there are other risks of infection and other serious complications in the pre-operative/procedural and postoperative/procedural stages of my (our) care. I (we) have asked all of the questions which I (we) thought were important in deciding whether or not to undergo treatment or diagnosis. These questions have been answered to my (our) satisfaction. I (we) understand that no assurance can be given that the procedure will be a success, and no guarantee or warranty of success has been given to me (us).     2. It has been explained to me (us) that during the course of the operation/procedure, unforeseen conditions may be revealed that necessitate extension of the original procedure(s) or different procedure(s) than those set forth in Paragraph 1. I (we) authorize and request that the above-named physician, his/her assistants or his/her designees, perform procedures as necessary and desirable if deemed to be in my (our) best interest.     Revised 8/2/2021                                                                          Page 1 of 2       3. I acknowledge that health care personnel may be observing this procedure for the purpose of medical education or other specified purposes as may be necessary as requested and/or approved by my (our) physician. 4. I (we) consent to the disposal by the hospital Pathologist of the removed tissue, parts or organs in accordance with hospital policy. 5. I do ____ do not ____ consent to the use of a local infiltration pain blocking agent that will be used by my provider/surgical provider to help alleviate pain during my procedure. 6. I do ____ do not ____ consent to an emergent blood transfusion in the case of a life-threatening situation that requires blood components to be administered. This consent is valid for 24 hours from the beginning of the procedure. 7. This patient does ____ or does not ____ currently have a DNR status/order. If DNR order is in place, obtain Addendum to the Surgical Consent for ALL Patients with a DNR Order to address mihai-operative status for limited intervention or DNR suspension.      8. I have read and fully understand the above Consent for Operation/Procedure and that all blanks were completed before I signed the consent.   _____________________________       _____________________      ____/____am/pm  Signature of Patient or legal representative      Printed Name / Relationship            Date / Time   ____________________________       _____________________      ____/____am/pm  Witness to Signature                                    Printed Name                    Date / Time     If patient is unable to sign or is a minor, complete the following)  Patient is a minor, ____ years of age, or unable to sign because:   ______________________________________________________________________________________________    [place] If a phone consent is obtained, consent will be documented by using two health care professionals, each affirming that the consenting party has no questions and gives consent for the procedure discussed with the physician/provider.   _____________________          ____________________       _____/_____am/pm   2nd witness to phone consent        Printed name           Date / Time    Informed Consent:  I have provided the explanation described above in section 1 to the patient and/or legal representative.  I have provided the patient and/or legal representative with an opportunity to ask any questions about the proposed operation/procedure.   ___________________________          ____________________         ____/____am/pm  Provider / Proceduralist                            Printed name            Date / Time  Revised 8/2/2021                                                                      Page 2 of 2

## 2022-06-11 ENCOUNTER — ANESTHESIA EVENT (OUTPATIENT)
Dept: OPERATING ROOM | Age: 37
End: 2022-06-11
Payer: COMMERCIAL

## 2022-06-14 ENCOUNTER — ANESTHESIA (OUTPATIENT)
Dept: OPERATING ROOM | Age: 37
End: 2022-06-14
Payer: COMMERCIAL

## 2022-06-14 ENCOUNTER — HOSPITAL ENCOUNTER (OUTPATIENT)
Age: 37
Setting detail: OUTPATIENT SURGERY
Discharge: HOME | End: 2022-06-14
Attending: UROLOGY | Admitting: UROLOGY
Payer: COMMERCIAL

## 2022-06-14 ENCOUNTER — APPOINTMENT (OUTPATIENT)
Dept: GENERAL RADIOLOGY | Age: 37
End: 2022-06-14
Attending: UROLOGY
Payer: COMMERCIAL

## 2022-06-14 VITALS
DIASTOLIC BLOOD PRESSURE: 80 MMHG | RESPIRATION RATE: 12 BRPM | WEIGHT: 98 LBS | HEART RATE: 86 BPM | OXYGEN SATURATION: 99 % | BODY MASS INDEX: 16.33 KG/M2 | HEIGHT: 65 IN | SYSTOLIC BLOOD PRESSURE: 127 MMHG | TEMPERATURE: 97 F

## 2022-06-14 PROCEDURE — 3700000001 HC ADD 15 MINUTES (ANESTHESIA): Performed by: UROLOGY

## 2022-06-14 PROCEDURE — 3700000000 HC ANESTHESIA ATTENDED CARE: Performed by: UROLOGY

## 2022-06-14 PROCEDURE — 6360000002 HC RX W HCPCS: Performed by: UROLOGY

## 2022-06-14 PROCEDURE — 7100000001 HC PACU RECOVERY - ADDTL 15 MIN: Performed by: UROLOGY

## 2022-06-14 PROCEDURE — 2500000003 HC RX 250 WO HCPCS: Performed by: NURSE ANESTHETIST, CERTIFIED REGISTERED

## 2022-06-14 PROCEDURE — C1769 GUIDE WIRE: HCPCS | Performed by: UROLOGY

## 2022-06-14 PROCEDURE — 3600000014 HC SURGERY LEVEL 4 ADDTL 15MIN: Performed by: UROLOGY

## 2022-06-14 PROCEDURE — 2709999900 HC NON-CHARGEABLE SUPPLY: Performed by: UROLOGY

## 2022-06-14 PROCEDURE — 7100000010 HC PHASE II RECOVERY - FIRST 15 MIN: Performed by: UROLOGY

## 2022-06-14 PROCEDURE — C1758 CATHETER, URETERAL: HCPCS | Performed by: UROLOGY

## 2022-06-14 PROCEDURE — 2580000003 HC RX 258: Performed by: UROLOGY

## 2022-06-14 PROCEDURE — 3600000004 HC SURGERY LEVEL 4 BASE: Performed by: UROLOGY

## 2022-06-14 PROCEDURE — 6360000002 HC RX W HCPCS: Performed by: NURSE ANESTHETIST, CERTIFIED REGISTERED

## 2022-06-14 PROCEDURE — 6360000002 HC RX W HCPCS: Performed by: ANESTHESIOLOGY

## 2022-06-14 PROCEDURE — 2580000003 HC RX 258: Performed by: ANESTHESIOLOGY

## 2022-06-14 PROCEDURE — 74420 UROGRAPHY RTRGR +-KUB: CPT

## 2022-06-14 PROCEDURE — 7100000000 HC PACU RECOVERY - FIRST 15 MIN: Performed by: UROLOGY

## 2022-06-14 PROCEDURE — 6360000004 HC RX CONTRAST MEDICATION: Performed by: UROLOGY

## 2022-06-14 PROCEDURE — 7100000011 HC PHASE II RECOVERY - ADDTL 15 MIN: Performed by: UROLOGY

## 2022-06-14 RX ORDER — MEPERIDINE HYDROCHLORIDE 25 MG/ML
12.5 INJECTION INTRAMUSCULAR; INTRAVENOUS; SUBCUTANEOUS AS NEEDED
Status: DISCONTINUED | OUTPATIENT
Start: 2022-06-14 | End: 2022-06-14 | Stop reason: HOSPADM

## 2022-06-14 RX ORDER — PROCHLORPERAZINE EDISYLATE 5 MG/ML
5 INJECTION INTRAMUSCULAR; INTRAVENOUS
Status: COMPLETED | OUTPATIENT
Start: 2022-06-14 | End: 2022-06-14

## 2022-06-14 RX ORDER — SODIUM CHLORIDE 0.9 % (FLUSH) 0.9 %
5-40 SYRINGE (ML) INJECTION PRN
Status: DISCONTINUED | OUTPATIENT
Start: 2022-06-14 | End: 2022-06-14 | Stop reason: HOSPADM

## 2022-06-14 RX ORDER — DIPHENHYDRAMINE HYDROCHLORIDE 50 MG/ML
12.5 INJECTION INTRAMUSCULAR; INTRAVENOUS
Status: DISCONTINUED | OUTPATIENT
Start: 2022-06-14 | End: 2022-06-14 | Stop reason: HOSPADM

## 2022-06-14 RX ORDER — ONDANSETRON 2 MG/ML
INJECTION INTRAMUSCULAR; INTRAVENOUS PRN
Status: DISCONTINUED | OUTPATIENT
Start: 2022-06-14 | End: 2022-06-14 | Stop reason: SDUPTHER

## 2022-06-14 RX ORDER — PROPOFOL 10 MG/ML
INJECTION, EMULSION INTRAVENOUS CONTINUOUS PRN
Status: DISCONTINUED | OUTPATIENT
Start: 2022-06-14 | End: 2022-06-14 | Stop reason: SDUPTHER

## 2022-06-14 RX ORDER — MIDAZOLAM HYDROCHLORIDE 1 MG/ML
INJECTION INTRAMUSCULAR; INTRAVENOUS PRN
Status: DISCONTINUED | OUTPATIENT
Start: 2022-06-14 | End: 2022-06-14 | Stop reason: SDUPTHER

## 2022-06-14 RX ORDER — HYDRALAZINE HYDROCHLORIDE 20 MG/ML
10 INJECTION INTRAMUSCULAR; INTRAVENOUS
Status: DISCONTINUED | OUTPATIENT
Start: 2022-06-14 | End: 2022-06-14 | Stop reason: HOSPADM

## 2022-06-14 RX ORDER — ONDANSETRON 2 MG/ML
4 INJECTION INTRAMUSCULAR; INTRAVENOUS
Status: DISCONTINUED | OUTPATIENT
Start: 2022-06-14 | End: 2022-06-14 | Stop reason: HOSPADM

## 2022-06-14 RX ORDER — PROPOFOL 10 MG/ML
INJECTION, EMULSION INTRAVENOUS PRN
Status: DISCONTINUED | OUTPATIENT
Start: 2022-06-14 | End: 2022-06-14 | Stop reason: SDUPTHER

## 2022-06-14 RX ORDER — SODIUM CHLORIDE 0.9 % (FLUSH) 0.9 %
5-40 SYRINGE (ML) INJECTION EVERY 12 HOURS SCHEDULED
Status: DISCONTINUED | OUTPATIENT
Start: 2022-06-14 | End: 2022-06-14 | Stop reason: HOSPADM

## 2022-06-14 RX ORDER — LIDOCAINE HYDROCHLORIDE 10 MG/ML
INJECTION, SOLUTION INFILTRATION; PERINEURAL PRN
Status: DISCONTINUED | OUTPATIENT
Start: 2022-06-14 | End: 2022-06-14 | Stop reason: SDUPTHER

## 2022-06-14 RX ORDER — CIPROFLOXACIN 2 MG/ML
400 INJECTION, SOLUTION INTRAVENOUS ONCE
Status: COMPLETED | OUTPATIENT
Start: 2022-06-14 | End: 2022-06-14

## 2022-06-14 RX ORDER — SODIUM CHLORIDE 9 MG/ML
INJECTION, SOLUTION INTRAVENOUS PRN
Status: DISCONTINUED | OUTPATIENT
Start: 2022-06-14 | End: 2022-06-14 | Stop reason: HOSPADM

## 2022-06-14 RX ORDER — SODIUM CHLORIDE, SODIUM LACTATE, POTASSIUM CHLORIDE, CALCIUM CHLORIDE 600; 310; 30; 20 MG/100ML; MG/100ML; MG/100ML; MG/100ML
INJECTION, SOLUTION INTRAVENOUS CONTINUOUS
Status: DISCONTINUED | OUTPATIENT
Start: 2022-06-14 | End: 2022-06-14 | Stop reason: HOSPADM

## 2022-06-14 RX ORDER — MAGNESIUM HYDROXIDE 1200 MG/15ML
LIQUID ORAL PRN
Status: DISCONTINUED | OUTPATIENT
Start: 2022-06-14 | End: 2022-06-14 | Stop reason: HOSPADM

## 2022-06-14 RX ORDER — FENTANYL CITRATE 50 UG/ML
INJECTION, SOLUTION INTRAMUSCULAR; INTRAVENOUS PRN
Status: DISCONTINUED | OUTPATIENT
Start: 2022-06-14 | End: 2022-06-14 | Stop reason: SDUPTHER

## 2022-06-14 RX ADMIN — PROPOFOL 125 MCG/KG/MIN: 10 INJECTION, EMULSION INTRAVENOUS at 09:50

## 2022-06-14 RX ADMIN — FENTANYL CITRATE 25 MCG: 50 INJECTION, SOLUTION INTRAMUSCULAR; INTRAVENOUS at 10:00

## 2022-06-14 RX ADMIN — LIDOCAINE HYDROCHLORIDE 40 MG: 10 INJECTION, SOLUTION INFILTRATION; PERINEURAL at 09:50

## 2022-06-14 RX ADMIN — PROCHLORPERAZINE EDISYLATE 5 MG: 5 INJECTION, SOLUTION INTRAMUSCULAR; INTRAVENOUS at 11:29

## 2022-06-14 RX ADMIN — PROPOFOL 50 MG: 10 INJECTION, EMULSION INTRAVENOUS at 09:50

## 2022-06-14 RX ADMIN — FENTANYL CITRATE 25 MCG: 50 INJECTION, SOLUTION INTRAMUSCULAR; INTRAVENOUS at 10:15

## 2022-06-14 RX ADMIN — ONDANSETRON 4 MG: 2 INJECTION INTRAMUSCULAR; INTRAVENOUS at 09:57

## 2022-06-14 RX ADMIN — FENTANYL CITRATE 25 MCG: 50 INJECTION, SOLUTION INTRAMUSCULAR; INTRAVENOUS at 09:54

## 2022-06-14 RX ADMIN — FENTANYL CITRATE 25 MCG: 50 INJECTION, SOLUTION INTRAMUSCULAR; INTRAVENOUS at 09:50

## 2022-06-14 RX ADMIN — MIDAZOLAM HYDROCHLORIDE 1 MG: 2 INJECTION, SOLUTION INTRAMUSCULAR; INTRAVENOUS at 09:48

## 2022-06-14 RX ADMIN — MIDAZOLAM HYDROCHLORIDE 1 MG: 2 INJECTION, SOLUTION INTRAMUSCULAR; INTRAVENOUS at 09:44

## 2022-06-14 RX ADMIN — SODIUM CHLORIDE, POTASSIUM CHLORIDE, SODIUM LACTATE AND CALCIUM CHLORIDE: 600; 310; 30; 20 INJECTION, SOLUTION INTRAVENOUS at 08:47

## 2022-06-14 RX ADMIN — CIPROFLOXACIN 400 MG: 2 INJECTION, SOLUTION INTRAVENOUS at 09:55

## 2022-06-14 ASSESSMENT — PAIN SCALES - GENERAL
PAINLEVEL_OUTOF10: 0

## 2022-06-14 ASSESSMENT — PAIN DESCRIPTION - DESCRIPTORS: DESCRIPTORS: ACHING;DULL

## 2022-06-14 ASSESSMENT — LIFESTYLE VARIABLES: SMOKING_STATUS: 0

## 2022-06-14 ASSESSMENT — PAIN - FUNCTIONAL ASSESSMENT: PAIN_FUNCTIONAL_ASSESSMENT: 0-10

## 2022-06-14 NOTE — PROGRESS NOTES
PACU Transfer to [facility]    Vitals:    06/14/22 1145   BP: 136/74   Pulse: 93   Resp: (!) 9   Temp: 97 °F (36.1 °C)   SpO2: 100%         Intake/Output Summary (Last 24 hours) at 6/14/2022 1159  Last data filed at 6/14/2022 1158  Gross per 24 hour   Intake 1050 ml   Output 150 ml   Net 900 ml       Pain assessment: VS stable. No pain, nausea better after compazine. Voided 150 ml bloody urine. Family updated. Patient to [facility] bed#8 for discharge.    Pain Level: 0    Patient transferred to care of [facility] RN.    6/14/2022 11:59 AM

## 2022-06-14 NOTE — PROGRESS NOTES
Updated patients family on phone. Patient awake- complains of nausea- compazine IV given as ordered.

## 2022-06-14 NOTE — OP NOTE
area of potential concern was either a air bubble or incomplete opacification due to contrast injection so distally. At the inclusion the procedure the patient's bladder was drained the scope sheath the sheath was removed a rectal exam was performed that revealed an approximately 18 g prostate that was completely benign. The patient was taken recovery in stable condition having tolerated the procedure well. RIGHT RETROGRADE PYELOGRAM:  Filling defects, contrast extravasation or hydronephrosis. Normal study. Prompt drainage of contrast upon removal of the open ended. LEFT RETROGRADE PYELOGRAM:  Filling defects, contrast extravasation or hydronephrosis. Normal study. Prompt drainage of contrast upon removal of the open ended.       Pre-Op Antibiotic: Ciprofloxacin 400mg IV once   EBL: <1 mL  Complications: None    Specimen: None      Post Op Plan:   1) FU PRN    Electronically signed by Sabrina Mackenzie MD on 6/14/2022 at 10:25 AM

## 2022-06-14 NOTE — PROGRESS NOTES
Patient admitted to PACU. Post op   : 06/14/22 Room / Location: [address] [place] [address] / [facility]   Anesthesia Start: 1615 Anesthesia Stop: 286   Procedure: CYSTOSCOPY, BILATERAL RETROGRADE PYELOGRAM (Bilateral Kidney) Diagnosis:        Gross hematuria       (Gross hematuria [R31.0])   Surgeons: Cielo Jalloh MD Responsible Provider: Karen Finnegan DO   Anesthesia Type: Not recorded ASA Status     Report received from anesthesia personnel- no problems noted during the case. Patient drowsy- no complaints of pain or nausea. No meatal drainage noted.

## 2022-06-14 NOTE — H&P
Laurel Alatorre    [id number]      Department of General Surgery    Surgical Services     Pre-operative History and Physical      INDICATION:   Gross hematuria [R31.0]    Procedure(s):  CYSTOSCOPY, BILATERAL RETROGRADE PYELOGRAM    History obtained from: Patient interview and EHR     HISTORY:   The patient is a 39 y.o. male with a medical and surgical history as delineated below, who presents with c/o suprapubic pain and bilateral flank pain, which has been gradually increasing since December, 2021. At that time, he experienced gross hematuria, which has reportedly resolved. He was subsequently referred to urology, and presents today for the above procedure. Weight loss/gain: Yes (approximately 10 lbs over the past several months)  Recent Hx Steroid use: No  Known anesthesia problems:  Yes (PONV, prolonged emergence)  Recent history of abnormal bleeding: No  Illness screening: Patient denies recent fever, chills, worsening cough, or known sick exposure    Past Medical History:        Diagnosis Date    Prolonged emergence from general anesthesia      Past Surgical History:        Procedure Laterality Date    CHOLECYSTECTOMY  2014    COLONOSCOPY      TESTICLE REMOVAL  AGE 12    WISDOM TOOTH EXTRACTION         Medications Prior to Admission:   Prior to Admission medications    Medication Sig Start Date End Date Taking? Authorizing Provider   escitalopram (LEXAPRO) 5 MG tablet Take 1 tablet by mouth daily  Patient not taking: Reported on 5/16/2022 2/9/22   Kymberly Steele,    Emtricitabine-Tenofovir AF (DESCOVY PO) Take by mouth at bedtime     Historical Provider, MD       Allergies:  Patient has no known allergies.     Social History:   Social History     Socioeconomic History    Marital status: Single     Spouse name: None    Number of children: None    Years of education: None    Highest education level: None   Occupational History    None   Tobacco Use    Smoking status: Never Smoker    Smokeless tobacco: Never Used   Vaping Use    Vaping Use: Never used   Substance and Sexual Activity    Alcohol use: Not Currently    Drug use: Not Currently    Sexual activity: Not Currently   Other Topics Concern    None   Social History Narrative    None     Social Determinants of Health     Financial Resource Strain: Low Risk     Difficulty of Paying Living Expenses: Not hard at all   Food Insecurity: No Food Insecurity    Worried About Running Out of Food in the Last Year: Never true    Bashir of Food in the Last Year: Never true   Transportation Needs: No Transportation Needs    Lack of Transportation (Medical): No    Lack of Transportation (Non-Medical): No   Physical Activity: Inactive    Days of Exercise per Week: 0 days    Minutes of Exercise per Session: 0 min   Stress: No Stress Concern Present    Feeling of Stress : Only a little   Social Connections: Unknown    Frequency of Communication with Friends and Family: Three times a week    Frequency of Social Gatherings with Friends and Family: Three times a week    Attends Hindu Services: Never    Active Member of Clubs or Organizations: No    Attends Club or Organization Meetings: Never    Marital Status: Not on file   Intimate Partner Violence: Not At Risk    Fear of Current or Ex-Partner: No    Emotionally Abused: No    Physically Abused: No    Sexually Abused: No   Housing Stability: Unknown    Unable to Pay for Housing in the Last Year: No    Number of [place] in the Last Year: Not on file    Unstable Housing in the Last Year: No         Family History:   History reviewed. No pertinent family history. REVIEW OF SYSTEMS:    Constitutional: Negative for fever, chills, and fatigue. HENT: Negative for hearing loss. Eyes: Negative for visual disturbance. Respiratory: Negative for shortness of breath and wheezing.     Cardiovascular: Negative for chest pain, palpitations and exertional chest pressure. Gastrointestinal: Positive for diarrhea. Negative for nausea, vomiting, and constipation. Genitourinary: Positive for bilateral flank, suprapubic pain. Musculoskeletal: Negative for gait problem, and joint swelling. Skin: Negative for rash and nonhealing wounds. Neurological: Positive for occasional dizziness, light-headedness. Negative for syncope. Hematological: Does not bruise/bleed easily. Psychiatric/Behavioral: Negative for agitation. PHYSICAL EXAM:      /85   Pulse 86   Temp 98.7 °F (37.1 °C) (Temporal)   Resp 14   Ht 5' 5\" (1.651 m)   Wt 98 lb (44.5 kg)   SpO2 100%   BMI 16.31 kg/m²  I      Eyes:  pupils equal, round and reactive to light, conjunctivae normal.    Head/ENT:  Normocephalic, without obvious abnormality, atraumatic. Neck:  Supple, symmetrical, trachea midline, no adenopathy, no tenderness, skin warm and dry. Heart: Regular rate and rhythm. No murmur noted. Lungs:  No increased work of breathing, good air exchange, clear to auscultation bilaterally, no crackles or wheezing. Abdomen:  Soft, non-distended, non-tender, no masses palpated. Extremities:  No clubbing, cyanosis, or edema. ASSESSMENT AND PLAN:    1. Patient is a 39 y.o. male with above specified procedure planned. 2.  Access to ancillary services are available per request of the provider.     VINAYAK Munoz - BOB   6/14/2022

## 2022-06-14 NOTE — ANESTHESIA POSTPROCEDURE EVALUATION
Department of Anesthesiology  Postprocedure Note    Patient: Ina Smart  MRN: [id number]  YOB: 1985  Date of evaluation: 6/14/2022  Time:  11:20 AM     Procedure Summary     Date: 06/14/22 Room / Location: [address] [place] [address]    Anesthesia Start: 4427 Anesthesia Stop: 5818    Procedure: CYSTOSCOPY, BILATERAL RETROGRADE PYELOGRAM (Bilateral Kidney) Diagnosis:       Gross hematuria      (Gross hematuria [R31.0])    Surgeons: Sunita Cabrera MD Responsible Provider: Bautista Gonzalez DO    Anesthesia Type: general ASA Status: 2          Anesthesia Type: No value filed. Hever Phase I: Hever Score: 8    Hever Phase II:      Last vitals: Reviewed and per EMR flowsheets.        Anesthesia Post Evaluation    Patient location during evaluation: PACU  Patient participation: complete - patient participated  Level of consciousness: awake and alert  Pain score: 0  Airway patency: patent  Nausea & Vomiting: no nausea and no vomiting  Complications: no  Cardiovascular status: hemodynamically stable  Respiratory status: acceptable  Hydration status: stable

## 2022-06-20 ENCOUNTER — TELEPHONE (OUTPATIENT)
Dept: SURGERY | Age: 37
End: 2022-06-20

## 2022-06-20 NOTE — TELEPHONE ENCOUNTER
Patient seen 5/16/22 surgery letter received Laparoscopic bilateral inguinal hernia repair 1 hr of OR time needed under general     Pre op instructions reviewed including the need for a H&P and a 18 or older  for the DOS  Pre op packet mailed     Post op appt scheduled     Faxed to scheduling     Placed on calender and outlook

## 2022-06-23 ENCOUNTER — ANESTHESIA EVENT (OUTPATIENT)
Dept: ENDOSCOPY | Age: 37
End: 2022-06-23
Payer: COMMERCIAL

## 2022-06-24 ENCOUNTER — HOSPITAL ENCOUNTER (OUTPATIENT)
Age: 37
Setting detail: OUTPATIENT SURGERY
Discharge: HOME | End: 2022-06-24
Attending: INTERNAL MEDICINE | Admitting: INTERNAL MEDICINE
Payer: COMMERCIAL

## 2022-06-24 ENCOUNTER — ANESTHESIA (OUTPATIENT)
Dept: ENDOSCOPY | Age: 37
End: 2022-06-24
Payer: COMMERCIAL

## 2022-06-24 VITALS
RESPIRATION RATE: 16 BRPM | OXYGEN SATURATION: 99 % | BODY MASS INDEX: 16.33 KG/M2 | HEIGHT: 65 IN | WEIGHT: 98 LBS | DIASTOLIC BLOOD PRESSURE: 72 MMHG | TEMPERATURE: 97.8 F | HEART RATE: 75 BPM | SYSTOLIC BLOOD PRESSURE: 109 MMHG

## 2022-06-24 DIAGNOSIS — R19.7 DIARRHEA, UNSPECIFIED TYPE: ICD-10-CM

## 2022-06-24 DIAGNOSIS — R13.19 ESOPHAGEAL DYSPHAGIA: ICD-10-CM

## 2022-06-24 PROCEDURE — 88305 TISSUE EXAM BY PATHOLOGIST: CPT

## 2022-06-24 PROCEDURE — 2709999900 HC NON-CHARGEABLE SUPPLY: Performed by: INTERNAL MEDICINE

## 2022-06-24 PROCEDURE — 2580000003 HC RX 258: Performed by: ANESTHESIOLOGY

## 2022-06-24 PROCEDURE — 7100000011 HC PHASE II RECOVERY - ADDTL 15 MIN: Performed by: INTERNAL MEDICINE

## 2022-06-24 PROCEDURE — 3609012400 HC EGD TRANSORAL BIOPSY SINGLE/MULTIPLE: Performed by: INTERNAL MEDICINE

## 2022-06-24 PROCEDURE — 6360000002 HC RX W HCPCS: Performed by: NURSE ANESTHETIST, CERTIFIED REGISTERED

## 2022-06-24 PROCEDURE — 3609010300 HC COLONOSCOPY W/BIOPSY SINGLE/MULTIPLE: Performed by: INTERNAL MEDICINE

## 2022-06-24 PROCEDURE — 7100000010 HC PHASE II RECOVERY - FIRST 15 MIN: Performed by: INTERNAL MEDICINE

## 2022-06-24 PROCEDURE — 3700000000 HC ANESTHESIA ATTENDED CARE: Performed by: INTERNAL MEDICINE

## 2022-06-24 PROCEDURE — 3700000001 HC ADD 15 MINUTES (ANESTHESIA): Performed by: INTERNAL MEDICINE

## 2022-06-24 PROCEDURE — 88342 IMHCHEM/IMCYTCHM 1ST ANTB: CPT

## 2022-06-24 PROCEDURE — 2500000003 HC RX 250 WO HCPCS: Performed by: NURSE ANESTHETIST, CERTIFIED REGISTERED

## 2022-06-24 RX ORDER — PROPOFOL 10 MG/ML
INJECTION, EMULSION INTRAVENOUS PRN
Status: DISCONTINUED | OUTPATIENT
Start: 2022-06-24 | End: 2022-06-24 | Stop reason: SDUPTHER

## 2022-06-24 RX ORDER — SODIUM CHLORIDE 0.9 % (FLUSH) 0.9 %
5-40 SYRINGE (ML) INJECTION PRN
Status: DISCONTINUED | OUTPATIENT
Start: 2022-06-24 | End: 2022-06-24 | Stop reason: HOSPADM

## 2022-06-24 RX ORDER — LIDOCAINE HYDROCHLORIDE 20 MG/ML
INJECTION, SOLUTION EPIDURAL; INFILTRATION; INTRACAUDAL; PERINEURAL PRN
Status: DISCONTINUED | OUTPATIENT
Start: 2022-06-24 | End: 2022-06-24 | Stop reason: SDUPTHER

## 2022-06-24 RX ORDER — PANTOPRAZOLE SODIUM 40 MG/1
40 TABLET, DELAYED RELEASE ORAL
Qty: 90 TABLET | Refills: 3 | Status: SHIPPED | OUTPATIENT
Start: 2022-06-24

## 2022-06-24 RX ORDER — SODIUM CHLORIDE, SODIUM LACTATE, POTASSIUM CHLORIDE, CALCIUM CHLORIDE 600; 310; 30; 20 MG/100ML; MG/100ML; MG/100ML; MG/100ML
INJECTION, SOLUTION INTRAVENOUS CONTINUOUS
Status: DISCONTINUED | OUTPATIENT
Start: 2022-06-24 | End: 2022-06-24 | Stop reason: HOSPADM

## 2022-06-24 RX ORDER — SODIUM CHLORIDE 0.9 % (FLUSH) 0.9 %
5-40 SYRINGE (ML) INJECTION EVERY 12 HOURS SCHEDULED
Status: DISCONTINUED | OUTPATIENT
Start: 2022-06-24 | End: 2022-06-24 | Stop reason: HOSPADM

## 2022-06-24 RX ORDER — SODIUM CHLORIDE 9 MG/ML
INJECTION, SOLUTION INTRAVENOUS PRN
Status: DISCONTINUED | OUTPATIENT
Start: 2022-06-24 | End: 2022-06-24 | Stop reason: HOSPADM

## 2022-06-24 RX ADMIN — LIDOCAINE HYDROCHLORIDE 50 MG: 20 INJECTION, SOLUTION EPIDURAL; INFILTRATION; INTRACAUDAL; PERINEURAL at 13:49

## 2022-06-24 RX ADMIN — PROPOFOL 50 MG: 10 INJECTION, EMULSION INTRAVENOUS at 13:54

## 2022-06-24 RX ADMIN — PROPOFOL 120 MG: 10 INJECTION, EMULSION INTRAVENOUS at 13:49

## 2022-06-24 RX ADMIN — SODIUM CHLORIDE, POTASSIUM CHLORIDE, SODIUM LACTATE AND CALCIUM CHLORIDE: 600; 310; 30; 20 INJECTION, SOLUTION INTRAVENOUS at 13:26

## 2022-06-24 RX ADMIN — PROPOFOL 80 MG: 10 INJECTION, EMULSION INTRAVENOUS at 13:51

## 2022-06-24 RX ADMIN — PROPOFOL 50 MG: 10 INJECTION, EMULSION INTRAVENOUS at 14:00

## 2022-06-24 RX ADMIN — PROPOFOL 50 MG: 10 INJECTION, EMULSION INTRAVENOUS at 14:07

## 2022-06-24 ASSESSMENT — PAIN SCALES - GENERAL: PAINLEVEL_OUTOF10: 0

## 2022-06-24 ASSESSMENT — PAIN - FUNCTIONAL ASSESSMENT: PAIN_FUNCTIONAL_ASSESSMENT: 0-10

## 2022-06-24 NOTE — H&P
Gastroenterology Note                 Pre-operative History and Physical    Patient: Rossy Gamble  : 1985  CSN:     History Obtained From:   Patient or guardian. HISTORY OF PRESENT ILLNESS:    The patient is a 39 y.o. male here for EGD/colonoscopy lower abdominal pain, diarrhea, personal history of polyps, strong family history of colon cancer, dysphagia, weight loss. TTG neg. Fecal paula neg. Past Medical History:    Past Medical History:   Diagnosis Date    Prolonged emergence from general anesthesia      Past Surgical History:    Past Surgical History:   Procedure Laterality Date    CHOLECYSTECTOMY      COLONOSCOPY      CYSTOSCOPY Bilateral 2022    CYSTOSCOPY, BILATERAL RETROGRADE PYELOGRAM performed by Lizzy Moss MD at [address] Right     eye muscle    TESTICLE REMOVAL  AGE 12    WISDOM TOOTH EXTRACTION       Medications Prior to Admission:   No current facility-administered medications on file prior to encounter. Current Outpatient Medications on File Prior to Encounter   Medication Sig Dispense Refill    escitalopram (LEXAPRO) 5 MG tablet Take 1 tablet by mouth daily (Patient not taking: Reported on 2022) 30 tablet 0    Emtricitabine-Tenofovir AF (DESCOVY PO) Take by mouth at bedtime           Allergies:  Patient has no known allergies. Social History:   Social History     Tobacco Use    Smoking status: Never Smoker    Smokeless tobacco: Never Used   Substance Use Topics    Alcohol use: Not Currently     Family History:   History reviewed. No pertinent family history. PHYSICAL EXAM:      /75   Pulse 74   Temp 97.9 °F (36.6 °C) (Temporal)   Resp 16   Ht 5' 5\" (1.651 m)   Wt 98 lb (44.5 kg)   SpO2 100%   BMI 16.31 kg/m²  I        Heart:  RRR, normal s1s2    Lungs:  CTA and normal effort    Abdomen:   Soft, nt nd. ASSESSMENT AND PLAN:    1.   Patient is a 39 y.o. male here for endoscopy with MAC sedation. 2.  Procedure options, risks and benefits reviewed with patient and/or guardian. They express understanding.     Willy Brittle, MD  [address] [place] [address]

## 2022-06-24 NOTE — ANESTHESIA PRE PROCEDURE
Department of Anesthesiology  Preprocedure Note       Name:  Arlene Henderson   Age:  39 y.o.  :  1985                                          MRN:  [id number]         Date:  2022      Surgeon: Neli Ohara):  Lino Mitchell MD    Procedure: Procedure(s):  ESOPHAGOGASTRODUODENOSCOPY  COLONOSCOPY    Medications prior to admission:   Prior to Admission medications    Medication Sig Start Date End Date Taking? Authorizing Provider   escitalopram (LEXAPRO) 5 MG tablet Take 1 tablet by mouth daily  Patient not taking: Reported on 2022   Kymberly Steele DO   Emtricitabine-Tenofovir AF (DESCOVY PO) Take by mouth at bedtime     Historical Provider, MD       Current medications:    No current facility-administered medications for this encounter. Allergies:  No Known Allergies    Problem List:    Patient Active Problem List   Diagnosis Code    Anxiety F41.9    Moderate episode of recurrent major depressive disorder ([facility] [address] F33.1    Underweight R63.6       Past Medical History:        Diagnosis Date    Prolonged emergence from general anesthesia        Past Surgical History:        Procedure Laterality Date    CHOLECYSTECTOMY      COLONOSCOPY      CYSTOSCOPY Bilateral 2022    CYSTOSCOPY, BILATERAL RETROGRADE PYELOGRAM performed by Valarie Natarajan MD at [address] Right     eye muscle    TESTICLE REMOVAL  AGE 12    WISDOM TOOTH EXTRACTION         Social History:    Social History     Tobacco Use    Smoking status: Never Smoker    Smokeless tobacco: Never Used   Substance Use Topics    Alcohol use: Not Currently                                Counseling given: Not Answered      Vital Signs (Current): There were no vitals filed for this visit.                                            BP Readings from Last 3 Encounters:   22 127/80   22 101/67   22 104/64       NPO Status: BMI:   Wt Readings from Last 3 Encounters:   06/14/22 98 lb (44.5 kg)   05/16/22 98 lb 12.8 oz (44.8 kg)   02/09/22 97 lb (44 kg)     There is no height or weight on file to calculate BMI.    CBC:   Lab Results   Component Value Date    WBC 4.6 02/07/2022    RBC 3.99 02/07/2022    HGB 13.6 02/07/2022    HCT 39.0 02/07/2022    MCV 98 02/07/2022     02/07/2022       CMP:   Lab Results   Component Value Date     02/07/2022    K 4.1 02/07/2022     02/07/2022    CO2 23 02/07/2022    BUN 7 02/07/2022    CREATININE 0.88 02/07/2022    GLUCOSE 96 02/07/2022    CALCIUM 9.7 02/07/2022    BILITOT 0.4 02/07/2022    ALKPHOS 91 02/07/2022    AST 18 02/07/2022    ALT 14 02/07/2022       POC Tests: No results for input(s): POCGLU, POCNA, POCK, POCCL, POCBUN, POCHEMO, POCHCT in the last 72 hours. Coags: No results found for: PROTIME, INR, APTT    HCG (If Applicable): No results found for: PREGTESTUR, PREGSERUM, HCG, HCGQUANT     ABGs: No results found for: PHART, PO2ART, QRU1EDQ, HNS9CMU, BEART, E5WDIZHJ     Type & Screen (If Applicable):  No results found for: LABABO, LABRH    Drug/Infectious Status (If Applicable):  No results found for: HIV, HEPCAB    COVID-19 Screening (If Applicable): No results found for: COVID19        Anesthesia Evaluation    Airway: Mallampati: II  TM distance: >3 FB   Neck ROM: full  Mouth opening: > = 3 FB   Dental:          Pulmonary:                              Cardiovascular:            Rhythm: regular  Rate: normal                    Neuro/Psych:   (+) psychiatric history:            GI/Hepatic/Renal:             Endo/Other:                     Abdominal:             Vascular: Other Findings:           Anesthesia Plan      MAC     ASA 2       Induction: intravenous. Anesthetic plan and risks discussed with patient. Plan discussed with CRNA.                     Nathan Brandon MD   6/24/2022

## 2022-06-24 NOTE — PROCEDURES
[facility] [place]  Colonoscopy Note    Patient: Jared Carty  : 1985  Acct#:     Procedure: Colonoscopy with biopsy    Date:  2022    Surgeon:  Hallie Fabian MD    Referring Physician:  Stephon Russell MD    Anesthesia: IV propofol, per anesthesia    EBL: <50 mL    Indications: This is a 39y.o. year old male who presents today with family history of colon cancer  chronic diarrhea    Procedure: An informed consent was obtained from the patient after explanation of indications, benefits, possible risks and complications of the procedure. The patient was then taken to the endoscopy suite, placed in the left lateral decubitus position, and the above IV anesthesia was administered. A digital rectal examination was performed and revealed negative without mass, lesions or tenderness. The Olympus PCFQ-H190 video colonoscope was placed in the patient's rectum under digital direction and advanced to the cecum. The cecum was identified by characteristic anatomy and ballottment. The prep was adequate. The ileocecal valve was intubated     Findings:  Visualization of the terminal ileum demonstrated normal mucosa. The colon was normal. Biopsies were taken throughout the colon to evaluate for microscopic colitis. The scope was then withdrawn into the rectum and retroflexed. The retroflexed view of the anal verge and rectum demonstrates small internal hemorrhoids. The scope was straightened, the colon was decompressed and the scope was withdrawn from the patient. The patient tolerated the procedure well and was taken to the PACU in good condition. Biopsies:  Yes      Impression:   Normal terminal ileum. Normal colon mucosa. Biopsies were taken throughout the colon to evaluate for microscopic colitis. Small internal hemorrhoids. Recommendations:  Await biopsy results. Colonoscopy in 5 years.      Stephon Russell MD  [facility]

## 2022-06-24 NOTE — PROCEDURES
[address] [place] [address]  Endoscopy Note    Patient: Nadja Sorto  : 1985  Acct#:     Procedure: Esophagogastroduodenoscopy with biopsy    Date:  2022     Surgeon:  Jose Guidry MD      Anesthesia:    IV propofol, per anesthesia       EBL: <50 mL    Indications: Dysphagia, weight loss    Description of Procedure:    Informed consent was obtained from the patient after explanation of indications, benefits and possible risks and complications of the procedure. The patient was then taken to the endoscopy suite, placed in the left lateral decubitus position and the above IV sedation was administrered. The Olympus videoendoscope (GIF-H190) was placed in the patient's mouth and under direct visualization passed into the esophagus. The scope was then advanced into the stomach and to the second portion of the duodenum. A retroflexed exam of the gastric cardia and fundus was performed. The scope was then withdrawn back into the stomach, it was decompressed, and the scope was completely withdrawn. Findings:  Normal first and second portion of the duodenum. Biopsies were not obtained as celiac serology was normal.  Multiple areas of focal pinpoint hemorrhage throughout the gastric body. Areas of hematin were irrigated with no underlying lesions and no further bleeding. Biopsies were taken throughout the stomach to evaluate for H. pylori. Small hiatal hernia  Grade A reflux esophagitis. No Villaseñor's. The patient tolerated the procedure well and was taken to the post anesthesia care unit in good condition. Biopsies: Yes. Impression:    Normal first and second portion of the duodenum. Biopsies were not obtained as celiac serology was normal.  Multiple areas of focal pinpoint hemorrhage throughout the gastric body. Areas of hematin were irrigated with no underlying lesions and no further bleeding. Biopsies were taken throughout the stomach to evaluate for H. pylori.   Small hiatal hernia  Grade A reflux esophagitis. No Villaseñor's. Recommendations:   Await biopsy results. Pantoprazole 40 mg daily. Colonoscopy today as planned.       Eden Avilez MD  [facility] [place]

## 2022-06-24 NOTE — ANESTHESIA POSTPROCEDURE EVALUATION
Department of Anesthesiology  Postprocedure Note    Patient: Alma Rosa Haywood  MRN: [id number]  YOB: 1985  Date of evaluation: 6/24/2022      Procedure Summary     Date: 06/24/22 Room / Location: [facility]    Anesthesia Start: 7673 Anesthesia Stop: 4438    Procedures:       EGD BIOPSY (N/A )      COLONOSCOPY WITH BIOPSY (N/A ) Diagnosis:       Esophageal dysphagia      Diarrhea, unspecified type      (Esophageal dysphagia [R13.19], Diarrhea, unspecified type [R19.7])    Surgeons: Billie Shirley MD Responsible Provider: Prince Anne MD    Anesthesia Type: MAC ASA Status: 2          Anesthesia Type: No value filed.     Hever Phase I: Hever Score: 6    Hever Phase II: Hever Score: 10      Anesthesia Post Evaluation    Patient location during evaluation: PACU  Level of consciousness: awake  Complications: no  Multimodal analgesia pain management approach

## 2022-06-27 ENCOUNTER — OFFICE VISIT (OUTPATIENT)
Dept: INTERNAL MEDICINE CLINIC | Age: 37
End: 2022-06-27
Payer: COMMERCIAL

## 2022-06-27 VITALS
SYSTOLIC BLOOD PRESSURE: 116 MMHG | DIASTOLIC BLOOD PRESSURE: 60 MMHG | HEART RATE: 74 BPM | HEIGHT: 65 IN | BODY MASS INDEX: 16.16 KG/M2 | WEIGHT: 97 LBS | OXYGEN SATURATION: 95 %

## 2022-06-27 DIAGNOSIS — F41.9 ANXIETY: ICD-10-CM

## 2022-06-27 DIAGNOSIS — Z01.818 PRE-OP EVALUATION: Primary | ICD-10-CM

## 2022-06-27 DIAGNOSIS — R19.7 DIARRHEA, UNSPECIFIED TYPE: ICD-10-CM

## 2022-06-27 DIAGNOSIS — Z00.00 ANNUAL PHYSICAL EXAM: ICD-10-CM

## 2022-06-27 DIAGNOSIS — F33.1 MODERATE EPISODE OF RECURRENT MAJOR DEPRESSIVE DISORDER (HCC): ICD-10-CM

## 2022-06-27 DIAGNOSIS — K40.21 BILATERAL RECURRENT INGUINAL HERNIA WITHOUT OBSTRUCTION OR GANGRENE: ICD-10-CM

## 2022-06-27 PROCEDURE — 99214 OFFICE O/P EST MOD 30 MIN: CPT | Performed by: INTERNAL MEDICINE

## 2022-06-27 ASSESSMENT — PATIENT HEALTH QUESTIONNAIRE - PHQ9
SUM OF ALL RESPONSES TO PHQ QUESTIONS 1-9: 15
2. FEELING DOWN, DEPRESSED OR HOPELESS: 1
9. THOUGHTS THAT YOU WOULD BE BETTER OFF DEAD, OR OF HURTING YOURSELF: 1
5. POOR APPETITE OR OVEREATING: 3
1. LITTLE INTEREST OR PLEASURE IN DOING THINGS: 3
SUM OF ALL RESPONSES TO PHQ9 QUESTIONS 1 & 2: 4
6. FEELING BAD ABOUT YOURSELF - OR THAT YOU ARE A FAILURE OR HAVE LET YOURSELF OR YOUR FAMILY DOWN: 0
SUM OF ALL RESPONSES TO PHQ QUESTIONS 1-9: 16
3. TROUBLE FALLING OR STAYING ASLEEP: 1
SUM OF ALL RESPONSES TO PHQ QUESTIONS 1-9: 16
7. TROUBLE CONCENTRATING ON THINGS, SUCH AS READING THE NEWSPAPER OR WATCHING TELEVISION: 1
8. MOVING OR SPEAKING SO SLOWLY THAT OTHER PEOPLE COULD HAVE NOTICED. OR THE OPPOSITE, BEING SO FIGETY OR RESTLESS THAT YOU HAVE BEEN MOVING AROUND A LOT MORE THAN USUAL: 3
10. IF YOU CHECKED OFF ANY PROBLEMS, HOW DIFFICULT HAVE THESE PROBLEMS MADE IT FOR YOU TO DO YOUR WORK, TAKE CARE OF THINGS AT HOME, OR GET ALONG WITH OTHER PEOPLE: 2
4. FEELING TIRED OR HAVING LITTLE ENERGY: 3
SUM OF ALL RESPONSES TO PHQ QUESTIONS 1-9: 16

## 2022-06-27 ASSESSMENT — COLUMBIA-SUICIDE SEVERITY RATING SCALE - C-SSRS
1. WITHIN THE PAST MONTH, HAVE YOU WISHED YOU WERE DEAD OR WISHED YOU COULD GO TO SLEEP AND NOT WAKE UP?: YES
2. HAVE YOU ACTUALLY HAD ANY THOUGHTS OF KILLING YOURSELF?: NO
6. HAVE YOU EVER DONE ANYTHING, STARTED TO DO ANYTHING, OR PREPARED TO DO ANYTHING TO END YOUR LIFE?: NO

## 2022-06-27 NOTE — PROGRESS NOTES
[facility]  [facility]  Bibiana Tse,   Internal Medicine      Tona Portillo  YOB: 1985    Date of Service:  6/27/2022    Chief Complaint   Patient presents with   Precious Bach fax # [phone], Cass Swenson repair b/ l @ [facility]        HPI:    This patient presents tot office today for a preoperative evaluation at the request of surgeon, Dr. Vivian Sargent, who plans on performing lap inguinal hernia repair bilaterally on July 21 at [facility].  The current problem began 1 year ago, and symptoms have been worsening with time. Conservative therapy: N/A. Patient notes bilateral inguinal pain. He notes worsening on the left side. The right side has always been uncomfortable. Patient is feeling down and depressed. Has fleeting thoughts of SI but has not had any plan and states that he could never actually do it. We will consider starting his Lexapro that we had discussed at his last visit. Currently utilizing a support dog. Of note patient also had colonoscopy and EGD completed. Patient notes that gastroenterology is concerned about a form of colitis. Awaiting biopsies. Patient started on Protonix. Planned anesthesia: general anesthesia    Known anesthesia problems: patient notes difficulty waking up with anesthesia    Bleeding risk: No recent or remote history of abnormal bleeding  Personal or FH of DVT/PE: No   Recent steroid use: no  Exercise tolerance before surgery at least 4 METS (Can walk up a flight of steps or a hill or walk on level ground at 3 to 4 mph): Yes   Patient objection to receiving blood products: No    Patient Active Problem List   Diagnosis    Anxiety    Moderate episode of recurrent major depressive disorder ([facility] [address]    Underweight       Review of Systems  ROS negative except for those noted in the HPI above.        No Known Allergies  Outpatient Medications Marked as Taking for the 6/27/22 Activity: Inactive    Days of Exercise per Week: 0 days    Minutes of Exercise per Session: 0 min   Stress: No Stress Concern Present    Feeling of Stress : Only a little   Social Connections: Unknown    Frequency of Communication with Friends and Family: Three times a week    Frequency of Social Gatherings with Friends and Family: Three times a week    Attends Judaism Services: Never    Active Member of Clubs or Organizations: No    Attends Club or Organization Meetings: Never    Marital Status: Not on file   Intimate Partner Violence: Not At Risk    Fear of Current or Ex-Partner: No    Emotionally Abused: No    Physically Abused: No    Sexually Abused: No   Housing Stability: Unknown    Unable to Pay for Housing in the Last Year: No    Number of [place] in the Last Year: Not on file    Unstable Housing in the Last Year: No       Objective:    Vitals:    06/27/22 0758   BP: 116/60   Pulse: 74   SpO2: 95%   Weight: 97 lb (44 kg)   Height: 5' 5\" (1.651 m)       Wt Readings from Last 2 Encounters:   06/27/22 97 lb (44 kg)   06/24/22 98 lb (44.5 kg)     BP Readings from Last 3 Encounters:   06/27/22 116/60   06/24/22 109/72   06/14/22 127/80      Physical Exam  Constitutional:       General: He is not in acute distress. Appearance: Normal appearance. He is not ill-appearing. HENT:      Head: Normocephalic and atraumatic. Right Ear: External ear normal.      Left Ear: External ear normal.      Nose: Nose normal. No congestion or rhinorrhea. Mouth/Throat:      Pharynx: No oropharyngeal exudate or posterior oropharyngeal erythema. Eyes:      General: No scleral icterus. Extraocular Movements: Extraocular movements intact. Conjunctiva/sclera: Conjunctivae normal.   Cardiovascular:      Rate and Rhythm: Normal rate and regular rhythm. Heart sounds: No murmur heard. No friction rub. No gallop.     Pulmonary:      Effort: Pulmonary effort is normal. No respiratory distress. Breath sounds: No wheezing, rhonchi or rales. Abdominal:      General: Bowel sounds are normal. There is no distension. Palpations: Abdomen is soft. There is no mass. Tenderness: There is abdominal tenderness. There is no guarding or rebound. Musculoskeletal:         General: No signs of injury. Normal range of motion. Left lower leg: No edema. Lymphadenopathy:      Cervical: No cervical adenopathy. Skin:     General: Skin is warm. Findings: No erythema. Neurological:      General: No focal deficit present. Mental Status: He is alert and oriented to person, place, and time. Psychiatric:         Mood and Affect: Mood normal.         Behavior: Behavior normal.          No results found for: CHOLFAST, TRIGLYCFAST, HDL, LDLCALC, LDLDIRECT  No results found for: GLUF, LABA1C  Lab Results   Component Value Date     02/07/2022    K 4.1 02/07/2022    BUN 7 02/07/2022    CREATININE 0.88 02/07/2022    CALCIUM 9.7 02/07/2022     Lab Results   Component Value Date    ALT 14 02/07/2022    AST 18 02/07/2022     Lab Results   Component Value Date    HGB 13.6 02/07/2022     No results found for: TSHREFLEX, TSH          Assessment/Plan:     1. Pre-op evaluation  Patient is overall low risk for an intermediate risk surgery. Okay to proceed with surgery. Will try to obtain most recent labs from lab lorie. Patient to hold all medications except fro protonix the morning of surgery. 2. Anxiety  Patient continues to have anxiety and depression. This is around his abdominal discomfort diarrhea and possible new diagnosis of colitis as well as stress at work. He has not started the Lexapro. Notes that he may be started soon. Patient to notify me if he does decide to start it. Of note patient has had fleeting thoughts of suicidal ideation however notes that he could never act on this and has never had a plan. Encouraged to start Lexapro following the surgery.   Patient to notify me if he does so. 3. Moderate episode of recurrent major depressive disorder ([facility] [address]  See #2 above    4. Diarrhea, unspecified type  Patient with evaluation by GI. Awaiting biopsy results. 5. Bilateral inguinal hernia without obstruction or gangrene  Okay to proceed with surgery. Emergent procedure No  Active Cardiac Condition (decompensated HF, Arrhythmia, MI <3 weeks, severe valve disease):  No   Risk Level of Procedure Intermediate Risk (intraperitoneal, intrathoracic, HENT, orthopedic, or carotid endarterectomy, etc.)  Revised Cardiac Risk Index Risk factors: None    Patient is low risk for major cardiac complications during a intermediate risk procedure and may continue as planned.     Preoperative workup as follows: hemoglobin, hematocrit, electrolytes, creatinine, glucose, liver function studies  Change in medication regimen before surgery: Take Protonix on morning of surgery with sip of water, and hold all other medications until after surgery  Prophylaxis for cardiac events with perioperative beta-blockers:Not indicated    Deep vein thrombosis prophylaxis: regimen to be chosen by surgical team    Further recommendations requested from consultants: No    --Kymberly Steele DO on 6/27/2022 at 9:00 AM

## 2022-06-28 ENCOUNTER — PATIENT MESSAGE (OUTPATIENT)
Dept: INTERNAL MEDICINE CLINIC | Age: 37
End: 2022-06-28

## 2022-06-28 NOTE — TELEPHONE ENCOUNTER
From: Deep Haile  To: Dr. Oneal Genoveva: 6/28/2022 10:03 AM EDT  Subject: Wei Setting    Looks like tests results are in. I havent seen the forms come through yet. Just wondering when those should get uploaded.

## 2022-06-28 NOTE — TELEPHONE ENCOUNTER
Form has been filled out and will be scanned in.  Sending via [employer] [facility]

## 2022-06-28 NOTE — TELEPHONE ENCOUNTER
Can we please complete form for patient in my inbasket and let them know it is ready for . Thank you.

## 2022-06-30 ENCOUNTER — TELEPHONE (OUTPATIENT)
Dept: INTERNAL MEDICINE CLINIC | Age: 37
End: 2022-06-30

## 2022-06-30 ENCOUNTER — PATIENT MESSAGE (OUTPATIENT)
Dept: INTERNAL MEDICINE CLINIC | Age: 37
End: 2022-06-30

## 2022-06-30 ENCOUNTER — TELEMEDICINE (OUTPATIENT)
Dept: INTERNAL MEDICINE CLINIC | Age: 37
End: 2022-06-30
Payer: COMMERCIAL

## 2022-06-30 DIAGNOSIS — L03.213 PRESEPTAL CELLULITIS: Primary | ICD-10-CM

## 2022-06-30 PROCEDURE — 99213 OFFICE O/P EST LOW 20 MIN: CPT | Performed by: INTERNAL MEDICINE

## 2022-06-30 RX ORDER — AMOXICILLIN AND CLAVULANATE POTASSIUM 875; 125 MG/1; MG/1
1 TABLET, FILM COATED ORAL 2 TIMES DAILY
Qty: 14 TABLET | Refills: 0 | Status: SHIPPED | OUTPATIENT
Start: 2022-06-30 | End: 2022-07-07

## 2022-06-30 ASSESSMENT — PATIENT HEALTH QUESTIONNAIRE - PHQ9
3. TROUBLE FALLING OR STAYING ASLEEP: 0
10. IF YOU CHECKED OFF ANY PROBLEMS, HOW DIFFICULT HAVE THESE PROBLEMS MADE IT FOR YOU TO DO YOUR WORK, TAKE CARE OF THINGS AT HOME, OR GET ALONG WITH OTHER PEOPLE: 0
SUM OF ALL RESPONSES TO PHQ QUESTIONS 1-9: 0
SUM OF ALL RESPONSES TO PHQ QUESTIONS 1-9: 0
9. THOUGHTS THAT YOU WOULD BE BETTER OFF DEAD, OR OF HURTING YOURSELF: 0
SUM OF ALL RESPONSES TO PHQ QUESTIONS 1-9: 0
5. POOR APPETITE OR OVEREATING: 0
SUM OF ALL RESPONSES TO PHQ9 QUESTIONS 1 & 2: 0
7. TROUBLE CONCENTRATING ON THINGS, SUCH AS READING THE NEWSPAPER OR WATCHING TELEVISION: 0
2. FEELING DOWN, DEPRESSED OR HOPELESS: 0
8. MOVING OR SPEAKING SO SLOWLY THAT OTHER PEOPLE COULD HAVE NOTICED. OR THE OPPOSITE, BEING SO FIGETY OR RESTLESS THAT YOU HAVE BEEN MOVING AROUND A LOT MORE THAN USUAL: 0
4. FEELING TIRED OR HAVING LITTLE ENERGY: 0
SUM OF ALL RESPONSES TO PHQ QUESTIONS 1-9: 0
1. LITTLE INTEREST OR PLEASURE IN DOING THINGS: 0
6. FEELING BAD ABOUT YOURSELF - OR THAT YOU ARE A FAILURE OR HAVE LET YOURSELF OR YOUR FAMILY DOWN: 0

## 2022-06-30 NOTE — LETTER
To whom it may concern,    Please excuse my patient, Marissa Delacruz, from work today (6/30/22) and tomorrow (7/1/22) secondary to medical care.      Please feel free to reach out with any questions,    Magali Rosa, DO  Internal Medicine  [facility]  P: [phone]

## 2022-06-30 NOTE — PROGRESS NOTES
Abnormal -      Musculoskeletal:   [] Normal gait with no signs of ataxia         [x] Normal range of motion of neck        [] Abnormal -     Neurological:        [x] No Facial Asymmetry (Cranial nerve 7 motor function) (limited exam due to video visit)          [x] No gaze palsy        [] Abnormal -          Skin:        [] No significant exanthematous lesions or discoloration noted on facial skin         [] Abnormal -  Right eye lid swelling and redness            Psychiatric:       [x] Normal Affect [] Abnormal -        [x] No Hallucinations    Other pertinent observable physical exam findings:-             Erica Lieu, was evaluated through a synchronous (real-time) audio-video encounter. The patient (or guardian if applicable) is aware that this is a billable service, which includes applicable co-pays. This Virtual Visit was conducted with patient's (and/or legal guardian's) consent. The visit was conducted pursuant to the emergency declaration under the [address] [place] [address], [address] [place] [address] authority and the [employer] General Act. Patient identification was verified, and a caregiver was present when appropriate. The patient was located at Home: [address] [place] [id number]. Provider was located at [facility] (Appt Dept): [address] [facility],  [address] [facility].         --Roberto Do DO

## 2022-06-30 NOTE — TELEPHONE ENCOUNTER
Patient was seen today and is requesting a letter to be excused from work, be uploaded to his my chart. Patient has an appointment with SUDHIR tomorrow. He just wants to be able to provide his employer with something for today.

## 2022-06-30 NOTE — TELEPHONE ENCOUNTER
Patient is requesting a VV today for pink eye. He tried an E visit but it would Not let him submit. Please contact patient @ phone # provided.

## 2022-06-30 NOTE — TELEPHONE ENCOUNTER
From: Ronald Staton  To: Dr. Som Stewart: 6/30/2022 11:14 AM EDT  Subject: [place] eye    I woke up today in horrible pain in my right eye. Can we do a video visit today so I can see if I need medication. I had to call out of work due to pain so bad.

## 2022-07-01 ENCOUNTER — TELEPHONE (OUTPATIENT)
Dept: INTERNAL MEDICINE CLINIC | Age: 37
End: 2022-07-01

## 2022-07-01 DIAGNOSIS — L71.9 ROSACEA: Primary | ICD-10-CM

## 2022-07-01 NOTE — TELEPHONE ENCOUNTER
Patient states he saw the Urgent [address] today, and was told that he may have Rosacea , and that the stye is \"pretty bad, so he is to apply heat every 15 minutes 4-6 times daily. \"  He is requesting a Referral to a Dermatologist Dr. Gordy Peters recommends that would be in network with [facility]. Patient can be reached @ phone # provided should there be any questions.

## 2022-07-05 NOTE — TELEPHONE ENCOUNTER
Reviewed patient's insurance and the following are in network      [facility]   [address] [place] [address],  Nikki Frazier  [phone]

## 2022-07-05 NOTE — TELEPHONE ENCOUNTER
Please let patient know that the best way to be able to determine who is within network is to call his insurance company and ask for a list of in network dermatology providers. I will be happy to give him a referral to whomever he chooses, unfortunately no one within [facility] (who I know would be covered by his insurance) is taking new patients currently.

## 2022-07-06 ENCOUNTER — TELEPHONE (OUTPATIENT)
Dept: SURGERY | Age: 37
End: 2022-07-06

## 2022-07-06 NOTE — TELEPHONE ENCOUNTER
Patient called in wanting to discuss his short term disability and FMLA paper work     Please contact the patient at [phone] between 2:30pm and 3:30pm today on his lunch break

## 2022-07-07 NOTE — TELEPHONE ENCOUNTER
Patient called in again wanting to discuss his short term disability and FMLA paper work      Please contact the patient at [phone] between 2:30pm and 3:30pm today on his lunch break

## 2022-07-07 NOTE — TELEPHONE ENCOUNTER
Disability paperwork faxed over to [id number] [facility] @ [phone]. Emailed patient a copy of completed paperwork to [email or web address]

## 2022-07-19 RX ORDER — MONTELUKAST SODIUM 4 MG/1
2 TABLET, CHEWABLE ORAL NIGHTLY
COMMUNITY
Start: 2022-07-05

## 2022-07-19 RX ORDER — ACETAMINOPHEN 500 MG
500 TABLET ORAL EVERY 6 HOURS PRN
COMMUNITY

## 2022-07-19 RX ORDER — TAMSULOSIN HYDROCHLORIDE 0.4 MG/1
0.4 CAPSULE ORAL DAILY
COMMUNITY
End: 2022-08-15

## 2022-07-19 NOTE — PROGRESS NOTES
Place patient label inside box (if no patient label, complete below)  Name:  :  MR#:   Angelica Pool / PROCEDURE  I (we) Jakubbart Steiner (Patient Name) authorize Chris Zelaya (Provider / Moreno Carlisle) and/or such assistants as may be selected by him/her, to perform the following operation/procedure(s): LAPAROSCOPIC BILATERAL INGUINAL HERNIA REPAIR        Note: If unable to obtain consent prior to an emergent procedure, document the emergent reason in the medical record. This procedure has been explained to my (our) satisfaction and included in the explanation was: The intended benefit, nature, and extent of the procedure to be performed; The significant risks involved and the probability of success; Alternative procedures and methods of treatment; The dangers and probable consequences of such alternatives (including no procedure or treatment); The expected consequences of the procedure on my future health; Whether other qualified individuals would be performing important surgical tasks and/or whether  would be present to advise or support the procedure. I (we) understand that there are other risks of infection and other serious complications in the pre-operative/procedural and postoperative/procedural stages of my (our) care. I (we) have asked all of the questions which I (we) thought were important in deciding whether or not to undergo treatment or diagnosis. These questions have been answered to my (our) satisfaction. I (we) understand that no assurance can be given that the procedure will be a success, and no guarantee or warranty of success has been given to me (us). It has been explained to me (us) that during the course of the operation/procedure, unforeseen conditions may be revealed that necessitate extension of the original procedure(s) or different procedure(s) than those set forth in Paragraph 1.  I (we) authorize and request that the above-named physician, his/her assistants or his/her designees, perform procedures as necessary and desirable if deemed to be in my (our) best interest.     Revised 8/2/2021                                                                          Page 1 of 2         I acknowledge that health care personnel may be observing this procedure for the purpose of medical education or other specified purposes as may be necessary as requested and/or approved by my (our) physician. I (we) consent to the disposal by the hospital Pathologist of the removed tissue, parts or organs in accordance with hospital policy. I do ____ do not ____ consent to the use of a local infiltration pain blocking agent that will be used by my provider/surgical provider to help alleviate pain during my procedure. I do ____ do not ____ consent to an emergent blood transfusion in the case of a life-threatening situation that requires blood components to be administered. This consent is valid for 24 hours from the beginning of the procedure. This patient does ____ or does not ____ currently have a DNR status/order. If DNR order is in place, obtain Addendum to the Surgical Consent for ALL Patients with a DNR Order to address mihai-operative status for limited intervention or DNR suspension.      I have read and fully understand the above Consent for Operation/Procedure and that all blanks were completed before I signed the consent.   _____________________________       _____________________      ____/____am/pm  Signature of Patient or legal representative      Printed Name / Relationship            Date / Time   ____________________________       _____________________      ____/____am/pm  Witness to Signature                                    Printed Name                    Date / Time    If patient is unable to sign or is a minor, complete the following)  Patient is a minor, ____ years of age, or unable to sign because: ______________________________________________________________________________________________    If a phone consent is obtained, consent will be documented by using two health care professionals, each affirming that the consenting party has no questions and gives consent for the procedure discussed with the physician/provider.   _____________________          ____________________       _____/_____am/pm   2nd witness to phone consent        Printed name           Date / Time    Informed Consent:  I have provided the explanation described above in section 1 to the patient and/or legal representative.  I have provided the patient and/or legal representative with an opportunity to ask any questions about the proposed operation/procedure.   ___________________________          ____________________         ____/____am/pm  Provider / Proceduralist                            Printed name            Date / Time  Revised 8/2/2021                                                                      Page 2 of 2

## 2022-07-19 NOTE — PROGRESS NOTES
[facility] PRE-SURGICAL TESTING INSTRUCTIONS                                  PRIOR TO PROCEDURE DATE:        1. PLEASE FOLLOW ANY  GUIDELINES/ INSTRUCTIONS PRIOR TO YOUR PROCEDURE AS ADVISED BY YOUR SURGEON. 2. Arrange for someone to drive you home and be with you for the first 24 hours after discharge for your safety after your procedure for which you received sedation. Ensure it is someone we can share information with regarding your discharge. 3. You must contact your surgeon for instructions IF:  You are taking any blood thinners, aspirin, anti-inflammatory or vitamin E. There is a change in your physical condition such as a cold, fever, rash, cuts, sores or any other infection, especially near your surgical site. 4. Do not drink alcohol the day before or day of your procedure. 5. A Pre-op History and Physical for surgery MUST be completed by your Physician or Urgent Care within 30 days of your procedure date. Please bring a copy with you on the day of your procedure and along with any other testing performed. THE DAY OF YOUR PROCEDURE:  1. Follow instructions for ARRIVAL TIME as DIRECTED BY YOUR SURGEON. 2. Enter the MAIN entrance from [address] [place] [address] and follow the signs to the free [employer] or [employer] parking (offered free of charge 6am-5pm). 3. Enter the Main Entrance of the hospital (do not enter from the lower level of the parking garage). Upon entrance, check in with the  at the main desk on your left. If no one is available at the desk, proceed into the [facility] Waiting Room and go through the door directly into the [facility]. There is a Check-in desk ACROSS from Room 5 (marked with a sign hanging from the ceiling). The phone number for the surgery center is [phone]. 4. Please call [phone] option #2 option #2 if you have not been preregistered yet.   On the day of your procedure bring your insurance card and photo ID. You will be registered at your bedside once brought back to your room. 5. DO NOT EAT ANYTHING eight hours prior to your arrival for surgery. May have 8 ounces of water 4 hours prior to your arrival for surgery. NOTE: ALL Gastric, Bariatric and Bowel surgery patients MUST follow their surgeon's instructions. 6. MEDICATIONS   Take the following medications with a SMALL sip of water: protonix  Bariatric patient's call surgeon if on diabetic medications as some need to be stopped 1 week preop  Use your usual dose of inhalers the morning of surgery. BRING your rescue inhaler with you to hospital.   Anesthesia does NOT want you to take insulin the morning of surgery. They will control your blood sugar while you are at the hospital. Please contact your ordering physician for instructions regarding your insulin the night before your procedure. If you have an insulin pump, please keep it set on basal rate. 7. Do not swallow water when brushing teeth. No gum, candy, mints or ice chips. Refrain from smoking or at least decrease the amount. 8. Dress in loose, comfortable clothing appropriate for redressing after your procedure. Do not wear jewelry (including body piercings), make-up (especially NO eye make-up), fingernail polish (NO toenail polish if foot/leg surgery), lotion, powders or metal hairclips. 9. Dentures, glasses, or contacts will need to be removed before your procedure. Bring cases for your glasses, contacts, dentures, or hearing aids to protect them while you are in surgery. 10. If you use a CPAP, please bring it with you on the day of your procedure. 11. We recommend that valuable personal  belongings such as cash, cell phones, e-tablets or jewelry, be left at home during your stay. The hospital will not be responsible for valuables that are not secured in the hospital safe.  However, if your insurance requires a co-pay, you may want to bring a method of payment, i.e. Check or credit card, if you wish to pay your co-pay the day of surgery. 12. If you are to stay overnight, you may bring a bag with personal items. Please have any large items you may need brought in by your family after your arrival to your hospital room. 15. If you have a Living Will or Durable Power of , please bring a copy on the day of your procedure. 15. With your permission, one family member may accompany you while you are being prepared for surgery. Once you are ready, additional family members may join you. HOW WE KEEP YOU SAFE and WORK TO PREVENT SURGICAL SITE INFECTIONS:  1. Health care workers should always check your ID bracelet to verify your name and birth date. You will be asked many times to state your name, date of birth, and allergies. 2. Health care workers should always clean their hands with soap or alcohol gel before providing care to you. It is okay to ask anyone if they cleaned their hands before they touch you. 3. You will be actively involved in verifying the type of procedure you are having and ensuring the correct surgical site. This will be confirmed multiple times prior to your procedure. Do NOT jose your surgery site UNLESS instructed to by your surgeon. 4. Do not shave or wax for 72 hours prior to procedure near your operative site. Shaving with a razor can irritate your skin and make it easier to develop an infection. On the day of your procedure, any hair that needs to be removed near the surgical site will be clipped by a healthcare worker using a special clippers designed to avoid skin irritation. 5. When you are in the operating room, your surgical site will be cleansed with a special soap, and in most cases, you will be given an antibiotic before the surgery begins. What to expect AFTER YOUR PROCEDURE:  1. Immediately following your procedure, your will be taken to the PACU for the first phase of your recovery.   Your nurse will help you recover from any potential side effects of anesthesia, such as extreme drowsiness, changes in your vital signs or breathing patterns. Nausea, headache, muscle aches, or sore throat may also occur after anesthesia. Your nurse will help you manage these potential side effects. 2. For comfort and safety, arrange to have someone at home with you for the first 24 hours after discharge. 3. You and your family will be given written instructions about your diet, activity, dressing care, medications, and return visits. 4. Once at home, should issues with nausea, pain, or bleeding occur, or should you notice any signs of infection, you should call your surgeon. 5. Always clean your hands before and after caring for your wound. Do not let your family touch your surgery site without cleaning their hands. 6. Narcotic pain medications can cause significant constipation. You may want to add a stool softener to your postoperative medication schedule or speak to your surgeon on how best to manage this SIDE EFFECT. SPECIAL INSTRUCTIONS     Thank you for allowing us to care for you. We strive to exceed your expectations in the delivery of care and service provided to you and your family. If you need to contact the [place] [address] staff for any reason, please call us at [phone]    Instructions reviewed with patient during preadmission testing phone interview.   Elva Robbins RN.7/19/2022 .11:24 AM      ADDITIONAL EDUCATIONAL INFORMATION REVIEWED PER PHONE WITH YOU AND/OR YOUR FAMILY:  Yes Hibiclens® Bathing Instructions   No Antibacterial Soap

## 2022-07-20 ENCOUNTER — ANESTHESIA EVENT (OUTPATIENT)
Dept: OPERATING ROOM | Age: 37
End: 2022-07-20
Payer: COMMERCIAL

## 2022-07-21 ENCOUNTER — HOSPITAL ENCOUNTER (OUTPATIENT)
Age: 37
Setting detail: OUTPATIENT SURGERY
Discharge: HOME | End: 2022-07-21
Attending: SURGERY | Admitting: SURGERY
Payer: COMMERCIAL

## 2022-07-21 ENCOUNTER — ANESTHESIA (OUTPATIENT)
Dept: OPERATING ROOM | Age: 37
End: 2022-07-21
Payer: COMMERCIAL

## 2022-07-21 VITALS
OXYGEN SATURATION: 99 % | HEIGHT: 65 IN | RESPIRATION RATE: 16 BRPM | BODY MASS INDEX: 16.56 KG/M2 | HEART RATE: 91 BPM | DIASTOLIC BLOOD PRESSURE: 69 MMHG | WEIGHT: 99.4 LBS | SYSTOLIC BLOOD PRESSURE: 115 MMHG | TEMPERATURE: 98 F

## 2022-07-21 DIAGNOSIS — K40.20 NON-RECURRENT BILATERAL INGUINAL HERNIA WITHOUT OBSTRUCTION OR GANGRENE: Primary | ICD-10-CM

## 2022-07-21 PROCEDURE — 2500000003 HC RX 250 WO HCPCS

## 2022-07-21 PROCEDURE — 2580000003 HC RX 258: Performed by: SURGERY

## 2022-07-21 PROCEDURE — 7100000000 HC PACU RECOVERY - FIRST 15 MIN: Performed by: SURGERY

## 2022-07-21 PROCEDURE — 6360000002 HC RX W HCPCS: Performed by: FAMILY MEDICINE

## 2022-07-21 PROCEDURE — 6360000002 HC RX W HCPCS: Performed by: SURGERY

## 2022-07-21 PROCEDURE — 2500000003 HC RX 250 WO HCPCS: Performed by: SURGERY

## 2022-07-21 PROCEDURE — 3600000014 HC SURGERY LEVEL 4 ADDTL 15MIN: Performed by: SURGERY

## 2022-07-21 PROCEDURE — C1781 MESH (IMPLANTABLE): HCPCS | Performed by: SURGERY

## 2022-07-21 PROCEDURE — 6360000002 HC RX W HCPCS

## 2022-07-21 PROCEDURE — 7100000010 HC PHASE II RECOVERY - FIRST 15 MIN: Performed by: SURGERY

## 2022-07-21 PROCEDURE — 7100000011 HC PHASE II RECOVERY - ADDTL 15 MIN: Performed by: SURGERY

## 2022-07-21 PROCEDURE — 2709999900 HC NON-CHARGEABLE SUPPLY: Performed by: SURGERY

## 2022-07-21 PROCEDURE — 2580000003 HC RX 258: Performed by: NURSE ANESTHETIST, CERTIFIED REGISTERED

## 2022-07-21 PROCEDURE — 6370000000 HC RX 637 (ALT 250 FOR IP): Performed by: FAMILY MEDICINE

## 2022-07-21 PROCEDURE — C1713 ANCHOR/SCREW BN/BN,TIS/BN: HCPCS | Performed by: SURGERY

## 2022-07-21 PROCEDURE — 2580000003 HC RX 258: Performed by: ANESTHESIOLOGY

## 2022-07-21 PROCEDURE — 3700000000 HC ANESTHESIA ATTENDED CARE: Performed by: SURGERY

## 2022-07-21 PROCEDURE — 7100000001 HC PACU RECOVERY - ADDTL 15 MIN: Performed by: SURGERY

## 2022-07-21 PROCEDURE — 3600000004 HC SURGERY LEVEL 4 BASE: Performed by: SURGERY

## 2022-07-21 PROCEDURE — 49651 LAP ING HERNIA REPAIR RECUR: CPT | Performed by: SURGERY

## 2022-07-21 PROCEDURE — 3700000001 HC ADD 15 MINUTES (ANESTHESIA): Performed by: SURGERY

## 2022-07-21 DEVICE — MESH HERN L W10.8XL16CM L INGUINAL WHT POLYPR MFIL: Type: IMPLANTABLE DEVICE | Status: FUNCTIONAL

## 2022-07-21 DEVICE — MESH HERN L W10.8XL16CM R INGUINAL WHT POLYPR MFIL: Type: IMPLANTABLE DEVICE | Status: FUNCTIONAL

## 2022-07-21 DEVICE — SYSTEM PERM FIX L37CM 15 FAST CAPSUR: Type: IMPLANTABLE DEVICE | Status: FUNCTIONAL

## 2022-07-21 RX ORDER — LORAZEPAM 2 MG/ML
0.5 INJECTION INTRAMUSCULAR
Status: DISCONTINUED | OUTPATIENT
Start: 2022-07-21 | End: 2022-07-21 | Stop reason: HOSPADM

## 2022-07-21 RX ORDER — ROCURONIUM BROMIDE 10 MG/ML
INJECTION, SOLUTION INTRAVENOUS PRN
Status: DISCONTINUED | OUTPATIENT
Start: 2022-07-21 | End: 2022-07-21 | Stop reason: SDUPTHER

## 2022-07-21 RX ORDER — BUPIVACAINE HYDROCHLORIDE 5 MG/ML
INJECTION, SOLUTION EPIDURAL; INTRACAUDAL PRN
Status: DISCONTINUED | OUTPATIENT
Start: 2022-07-21 | End: 2022-07-21 | Stop reason: ALTCHOICE

## 2022-07-21 RX ORDER — SODIUM CHLORIDE, SODIUM LACTATE, POTASSIUM CHLORIDE, CALCIUM CHLORIDE 600; 310; 30; 20 MG/100ML; MG/100ML; MG/100ML; MG/100ML
INJECTION, SOLUTION INTRAVENOUS CONTINUOUS PRN
Status: DISCONTINUED | OUTPATIENT
Start: 2022-07-21 | End: 2022-07-21 | Stop reason: SDUPTHER

## 2022-07-21 RX ORDER — SODIUM CHLORIDE 0.9 % (FLUSH) 0.9 %
5-40 SYRINGE (ML) INJECTION EVERY 12 HOURS SCHEDULED
Status: DISCONTINUED | OUTPATIENT
Start: 2022-07-21 | End: 2022-07-21 | Stop reason: HOSPADM

## 2022-07-21 RX ORDER — LIDOCAINE HYDROCHLORIDE 20 MG/ML
INJECTION, SOLUTION INTRAVENOUS PRN
Status: DISCONTINUED | OUTPATIENT
Start: 2022-07-21 | End: 2022-07-21 | Stop reason: SDUPTHER

## 2022-07-21 RX ORDER — LABETALOL HYDROCHLORIDE 5 MG/ML
10 INJECTION, SOLUTION INTRAVENOUS
Status: DISCONTINUED | OUTPATIENT
Start: 2022-07-21 | End: 2022-07-21 | Stop reason: HOSPADM

## 2022-07-21 RX ORDER — SODIUM CHLORIDE 9 MG/ML
INJECTION, SOLUTION INTRAVENOUS PRN
Status: DISCONTINUED | OUTPATIENT
Start: 2022-07-21 | End: 2022-07-21 | Stop reason: HOSPADM

## 2022-07-21 RX ORDER — SCOLOPAMINE TRANSDERMAL SYSTEM 1 MG/1
1 PATCH, EXTENDED RELEASE TRANSDERMAL ONCE
Status: DISCONTINUED | OUTPATIENT
Start: 2022-07-21 | End: 2022-07-21 | Stop reason: HOSPADM

## 2022-07-21 RX ORDER — MAGNESIUM HYDROXIDE 1200 MG/15ML
LIQUID ORAL PRN
Status: DISCONTINUED | OUTPATIENT
Start: 2022-07-21 | End: 2022-07-21 | Stop reason: HOSPADM

## 2022-07-21 RX ORDER — MEPERIDINE HYDROCHLORIDE 25 MG/ML
12.5 INJECTION INTRAMUSCULAR; INTRAVENOUS; SUBCUTANEOUS EVERY 5 MIN PRN
Status: DISCONTINUED | OUTPATIENT
Start: 2022-07-21 | End: 2022-07-21 | Stop reason: HOSPADM

## 2022-07-21 RX ORDER — PROPOFOL 10 MG/ML
INJECTION, EMULSION INTRAVENOUS PRN
Status: DISCONTINUED | OUTPATIENT
Start: 2022-07-21 | End: 2022-07-21 | Stop reason: SDUPTHER

## 2022-07-21 RX ORDER — OXYCODONE HYDROCHLORIDE 5 MG/1
5 TABLET ORAL EVERY 6 HOURS PRN
Qty: 28 TABLET | Refills: 0 | Status: SHIPPED | OUTPATIENT
Start: 2022-07-21 | End: 2022-07-28

## 2022-07-21 RX ORDER — SODIUM CHLORIDE 9 MG/ML
INJECTION, SOLUTION INTRAVENOUS CONTINUOUS
Status: DISCONTINUED | OUTPATIENT
Start: 2022-07-21 | End: 2022-07-21 | Stop reason: HOSPADM

## 2022-07-21 RX ORDER — ONDANSETRON 2 MG/ML
INJECTION INTRAMUSCULAR; INTRAVENOUS PRN
Status: DISCONTINUED | OUTPATIENT
Start: 2022-07-21 | End: 2022-07-21 | Stop reason: SDUPTHER

## 2022-07-21 RX ORDER — SODIUM CHLORIDE, SODIUM LACTATE, POTASSIUM CHLORIDE, CALCIUM CHLORIDE 600; 310; 30; 20 MG/100ML; MG/100ML; MG/100ML; MG/100ML
INJECTION, SOLUTION INTRAVENOUS CONTINUOUS
Status: DISCONTINUED | OUTPATIENT
Start: 2022-07-21 | End: 2022-07-21 | Stop reason: HOSPADM

## 2022-07-21 RX ORDER — FLUMAZENIL 0.1 MG/ML
INJECTION, SOLUTION INTRAVENOUS
Status: COMPLETED
Start: 2022-07-21 | End: 2022-07-21

## 2022-07-21 RX ORDER — FENTANYL CITRATE 50 UG/ML
INJECTION, SOLUTION INTRAMUSCULAR; INTRAVENOUS PRN
Status: DISCONTINUED | OUTPATIENT
Start: 2022-07-21 | End: 2022-07-21 | Stop reason: SDUPTHER

## 2022-07-21 RX ORDER — SODIUM CHLORIDE 0.9 % (FLUSH) 0.9 %
5-40 SYRINGE (ML) INJECTION PRN
Status: DISCONTINUED | OUTPATIENT
Start: 2022-07-21 | End: 2022-07-21 | Stop reason: HOSPADM

## 2022-07-21 RX ORDER — MIDAZOLAM HYDROCHLORIDE 1 MG/ML
INJECTION INTRAMUSCULAR; INTRAVENOUS PRN
Status: DISCONTINUED | OUTPATIENT
Start: 2022-07-21 | End: 2022-07-21 | Stop reason: SDUPTHER

## 2022-07-21 RX ORDER — APREPITANT 40 MG/1
40 CAPSULE ORAL ONCE
Status: COMPLETED | OUTPATIENT
Start: 2022-07-21 | End: 2022-07-21

## 2022-07-21 RX ORDER — ONDANSETRON 2 MG/ML
4 INJECTION INTRAMUSCULAR; INTRAVENOUS
Status: DISCONTINUED | OUTPATIENT
Start: 2022-07-21 | End: 2022-07-21 | Stop reason: HOSPADM

## 2022-07-21 RX ORDER — SODIUM CHLORIDE 9 MG/ML
25 INJECTION, SOLUTION INTRAVENOUS PRN
Status: DISCONTINUED | OUTPATIENT
Start: 2022-07-21 | End: 2022-07-21 | Stop reason: HOSPADM

## 2022-07-21 RX ORDER — FLUMAZENIL 0.1 MG/ML
0.2 INJECTION, SOLUTION INTRAVENOUS EVERY 10 MIN PRN
Status: DISCONTINUED | OUTPATIENT
Start: 2022-07-21 | End: 2022-07-21 | Stop reason: HOSPADM

## 2022-07-21 RX ORDER — OXYCODONE HYDROCHLORIDE 5 MG/1
5 TABLET ORAL PRN
Status: COMPLETED | OUTPATIENT
Start: 2022-07-21 | End: 2022-07-21

## 2022-07-21 RX ORDER — OXYCODONE HYDROCHLORIDE 5 MG/1
10 TABLET ORAL PRN
Status: COMPLETED | OUTPATIENT
Start: 2022-07-21 | End: 2022-07-21

## 2022-07-21 RX ORDER — FENTANYL CITRATE 50 UG/ML
25 INJECTION, SOLUTION INTRAMUSCULAR; INTRAVENOUS EVERY 5 MIN PRN
Status: DISCONTINUED | OUTPATIENT
Start: 2022-07-21 | End: 2022-07-21 | Stop reason: HOSPADM

## 2022-07-21 RX ORDER — DEXAMETHASONE SODIUM PHOSPHATE 4 MG/ML
INJECTION, SOLUTION INTRA-ARTICULAR; INTRALESIONAL; INTRAMUSCULAR; INTRAVENOUS; SOFT TISSUE PRN
Status: DISCONTINUED | OUTPATIENT
Start: 2022-07-21 | End: 2022-07-21 | Stop reason: SDUPTHER

## 2022-07-21 RX ORDER — DIPHENHYDRAMINE HYDROCHLORIDE 50 MG/ML
12.5 INJECTION INTRAMUSCULAR; INTRAVENOUS
Status: DISCONTINUED | OUTPATIENT
Start: 2022-07-21 | End: 2022-07-21 | Stop reason: HOSPADM

## 2022-07-21 RX ORDER — PROCHLORPERAZINE EDISYLATE 5 MG/ML
5 INJECTION INTRAMUSCULAR; INTRAVENOUS
Status: DISCONTINUED | OUTPATIENT
Start: 2022-07-21 | End: 2022-07-21 | Stop reason: HOSPADM

## 2022-07-21 RX ADMIN — FLUMAZENIL 0.2 MG: 0.1 INJECTION, SOLUTION INTRAVENOUS at 10:32

## 2022-07-21 RX ADMIN — APREPITANT 40 MG: 40 CAPSULE ORAL at 07:05

## 2022-07-21 RX ADMIN — FENTANYL CITRATE 25 MCG: 50 INJECTION, SOLUTION INTRAMUSCULAR; INTRAVENOUS at 09:19

## 2022-07-21 RX ADMIN — PROPOFOL 170 MG: 10 INJECTION, EMULSION INTRAVENOUS at 07:26

## 2022-07-21 RX ADMIN — DEXAMETHASONE SODIUM PHOSPHATE 8 MG: 4 INJECTION, SOLUTION INTRAMUSCULAR; INTRAVENOUS at 07:31

## 2022-07-21 RX ADMIN — CEFAZOLIN 2000 MG: 2 INJECTION, POWDER, FOR SOLUTION INTRAMUSCULAR; INTRAVENOUS at 07:31

## 2022-07-21 RX ADMIN — FENTANYL CITRATE 50 MCG: 50 INJECTION, SOLUTION INTRAMUSCULAR; INTRAVENOUS at 07:26

## 2022-07-21 RX ADMIN — LIDOCAINE HYDROCHLORIDE 100 MG: 20 INJECTION, SOLUTION INTRAVENOUS at 07:26

## 2022-07-21 RX ADMIN — ROCURONIUM BROMIDE 10 MG: 10 INJECTION INTRAVENOUS at 08:11

## 2022-07-21 RX ADMIN — FENTANYL CITRATE 50 MCG: 50 INJECTION, SOLUTION INTRAMUSCULAR; INTRAVENOUS at 08:25

## 2022-07-21 RX ADMIN — OXYCODONE 10 MG: 5 TABLET ORAL at 13:22

## 2022-07-21 RX ADMIN — SODIUM CHLORIDE, SODIUM LACTATE, POTASSIUM CHLORIDE, AND CALCIUM CHLORIDE: .6; .31; .03; .02 INJECTION, SOLUTION INTRAVENOUS at 06:54

## 2022-07-21 RX ADMIN — ONDANSETRON 4 MG: 2 INJECTION INTRAMUSCULAR; INTRAVENOUS at 07:31

## 2022-07-21 RX ADMIN — ROCURONIUM BROMIDE 40 MG: 10 INJECTION INTRAVENOUS at 07:26

## 2022-07-21 RX ADMIN — SODIUM CHLORIDE, POTASSIUM CHLORIDE, SODIUM LACTATE AND CALCIUM CHLORIDE: 600; 310; 30; 20 INJECTION, SOLUTION INTRAVENOUS at 06:33

## 2022-07-21 RX ADMIN — SUGAMMADEX 200 MG: 100 INJECTION, SOLUTION INTRAVENOUS at 08:40

## 2022-07-21 RX ADMIN — MIDAZOLAM HYDROCHLORIDE 2 MG: 2 INJECTION, SOLUTION INTRAMUSCULAR; INTRAVENOUS at 07:20

## 2022-07-21 ASSESSMENT — PAIN DESCRIPTION - LOCATION
LOCATION: ABDOMEN

## 2022-07-21 ASSESSMENT — PAIN DESCRIPTION - DESCRIPTORS
DESCRIPTORS: SORE
DESCRIPTORS: SORE
DESCRIPTORS: ACHING;BURNING
DESCRIPTORS: SORE
DESCRIPTORS: SORE
DESCRIPTORS: ACHING

## 2022-07-21 ASSESSMENT — PAIN SCALES - GENERAL
PAINLEVEL_OUTOF10: 5
PAINLEVEL_OUTOF10: 0
PAINLEVEL_OUTOF10: 5
PAINLEVEL_OUTOF10: 0
PAINLEVEL_OUTOF10: 7
PAINLEVEL_OUTOF10: 0
PAINLEVEL_OUTOF10: 5
PAINLEVEL_OUTOF10: 0
PAINLEVEL_OUTOF10: 5
PAINLEVEL_OUTOF10: 7
PAINLEVEL_OUTOF10: 0
PAINLEVEL_OUTOF10: 0

## 2022-07-21 ASSESSMENT — PAIN DESCRIPTION - FREQUENCY
FREQUENCY: CONTINUOUS

## 2022-07-21 ASSESSMENT — PAIN DESCRIPTION - ORIENTATION
ORIENTATION: ANTERIOR

## 2022-07-21 ASSESSMENT — PAIN - FUNCTIONAL ASSESSMENT: PAIN_FUNCTIONAL_ASSESSMENT: 0-10

## 2022-07-21 NOTE — PROGRESS NOTES
Ambulatory Surgery/Procedure Discharge Note    Vitals:    07/21/22 1146   BP: 115/69   Pulse: 91   Resp: 16   Temp: 98 °F (36.7 °C)   SpO2: 99%       In: 100 [I.V.:100]  Out: 200 [Urine:200]    Restroom use offered before discharge. Yes    Pain assessment:  level of pain (1-10, 10 severe),   Pain Level: 5  Patient stated pain level tolerable for discharge. Patient discharged to home/self care. Patient discharged via wheel chair by transporter to waiting family/S.O. Patient able to urinate freely without issue.   Patient discharged to home with friend.    7/21/2022 2:42 PM

## 2022-07-21 NOTE — ANESTHESIA PRE PROCEDURE
Department of Anesthesiology  Preprocedure Note       Name:  Elly Hinojosa   Age:  39 y.o.  :  1985                                          MRN:  [id number]         Date:  2022      Surgeon: Jessie Mohs):  Wilfredo An MD    Procedure: Procedure(s):  LAPAROSCOPIC BILATERAL INGUINAL HERNIA REPAIR    Medications prior to admission:   Prior to Admission medications    Medication Sig Start Date End Date Taking? Authorizing Provider   colestipol (COLESTID) 1 g tablet 2 g at bedtime 22   Historical Provider, MD   acetaminophen (TYLENOL) 500 MG tablet Take 500 mg by mouth every 6 hours as needed for Pain    Historical Provider, MD   tamsulosin (FLOMAX) 0.4 MG capsule Take 0.4 mg by mouth in the morning. Historical Provider, MD   pantoprazole (PROTONIX) 40 MG tablet Take 1 tablet by mouth every morning (before breakfast) 22   Umu Luis MD   Emtricitabine-Tenofovir AF (DESCOVY PO) Take by mouth at bedtime     Historical Provider, MD       Current medications:    No current facility-administered medications for this visit. No current outpatient medications on file.      Facility-Administered Medications Ordered in Other Visits   Medication Dose Route Frequency Provider Last Rate Last Admin    ceFAZolin (ANCEF) 2,000 mg in sodium chloride 0.9 % 50 mL IVPB (mini-bag)  2,000 mg IntraVENous Once Wilfredo An MD        lactated ringers infusion   IntraVENous Continuous Marie Petersen,  mL/hr at 22 0410 New Bag at 22 4104    sodium chloride flush 0.9 % injection 5-40 mL  5-40 mL IntraVENous 2 times per day Marie Petersen, DO        sodium chloride flush 0.9 % injection 5-40 mL  5-40 mL IntraVENous PRN Marie Petersen, DO        0.9 % sodium chloride infusion   IntraVENous PRN Marie Petersen, DO           Allergies:  No Known Allergies    Problem List:    Patient Active Problem List   Diagnosis Code    Anxiety F41.9    Mod[facility] disorder [facility] F33.1    Underweight R63.6       Past Medical History:        Diagnosis Date    Anxiety     Depression     PONV (postoperative nausea and vomiting)     Prolonged emergence from general anesthesia        Past Surgical History:        Procedure Laterality Date    CHOLECYSTECTOMY  2014    COLONOSCOPY      COLONOSCOPY N/A 06/24/2022    COLONOSCOPY WITH BIOPSY performed by Sebastian Yoo MD at [address] [facility] Bilateral 06/14/2022    CYSTOSCOPY, BILATERAL RETROGRADE PYELOGRAM performed by Owen Garcia MD at [address], [place], DIAGNOSTIC      EYE SURGERY Right     eye muscle    TESTICLE REMOVAL  AGE 12    TYMPANOSTOMY TUBE PLACEMENT Bilateral     UPPER GASTROINTESTINAL ENDOSCOPY N/A 06/24/2022    EGD BIOPSY performed by Sebastian Yoo MD at [facility] EXTRACTION         Social History:    Social History     Tobacco Use    Smoking status: Never    Smokeless tobacco: Never   Substance Use Topics    Alcohol use: Not Currently                                Counseling given: Not Answered      Vital Signs (Current): There were no vitals filed for this visit.                                            BP Readings from Last 3 Encounters:   07/21/22 119/79   06/27/22 116/60   06/24/22 109/72       NPO Status:                                                                                 BMI:   Wt Readings from Last 3 Encounters:   07/21/22 99 lb 6.4 oz (45.1 kg)   06/27/22 97 lb (44 kg)   06/24/22 98 lb (44.5 kg)     There is no height or weight on file to calculate BMI.    CBC:   Lab Results   Component Value Date/Time    WBC 3.6 05/31/2022 12:00 AM    WBC 3.6 05/31/2022 12:00 AM    RBC 4.41 05/31/2022 12:00 AM    RBC 4.41 05/31/2022 12:00 AM    HGB 14.9 05/31/2022 12:00 AM    HGB 14.9 05/31/2022 12:00 AM    HCT 43.6 05/31/2022 12:00 AM    HCT 43.6 05/31/2022 12:00 AM    MCV 99 05/31/2022 12:00 AM    MCV 99 05/31/2022 12:00 AM     05/31/2022 12:00 AM     05/31/2022 12:00 AM       CMP:   Lab Results   Component Value Date/Time     05/31/2022 12:00 AM     05/31/2022 12:00 AM    K 4.2 05/31/2022 12:00 AM    K 4.2 05/31/2022 12:00 AM     05/31/2022 12:00 AM     05/31/2022 12:00 AM    CO2 23 05/31/2022 12:00 AM    CO2 23 05/31/2022 12:00 AM    BUN 9 05/31/2022 12:00 AM    BUN 9 05/31/2022 12:00 AM    CREATININE 0.88 05/31/2022 12:00 AM    CREATININE 0.88 05/31/2022 12:00 AM    LABGLOM 114 05/31/2022 12:00 AM    GLUCOSE 94 05/31/2022 12:00 AM    GLUCOSE 94 05/31/2022 12:00 AM    CALCIUM 9.4 05/31/2022 12:00 AM    CALCIUM 9.4 05/31/2022 12:00 AM    BILITOT 0.6 05/02/2022 12:00 AM    ALKPHOS 85 05/02/2022 12:00 AM    AST 15 05/02/2022 12:00 AM    ALT 13 05/02/2022 12:00 AM       POC Tests: No results for input(s): POCGLU, POCNA, POCK, POCCL, POCBUN, POCHEMO, POCHCT in the last 72 hours. Coags: No results found for: PROTIME, INR, APTT    HCG (If Applicable): No results found for: PREGTESTUR, PREGSERUM, HCG, HCGQUANT     ABGs: No results found for: PHART, PO2ART, QOT7CUO, OIS9MAT, BEART, K8QZHEGX     Type & Screen (If Applicable):  No results found for: LABABO, LABRH    Drug/Infectious Status (If Applicable):  No results found for: HIV, HEPCAB    COVID-19 Screening (If Applicable): No results found for: COVID19        Anesthesia Evaluation   history of anesthetic complications:   Airway: Mallampati: II  TM distance: >3 FB   Neck ROM: full  Mouth opening: > = 3 FB   Dental:          Pulmonary:                              Cardiovascular:            Rhythm: regular  Rate: normal                    Neuro/Psych:   (+) psychiatric history:            GI/Hepatic/Renal:             Endo/Other:                     Abdominal:             Vascular: Other Findings:             Anesthesia Plan      general     ASA 2       Induction: intravenous. Anesthetic plan and risks discussed with patient.       Plan discussed with CRNA.                     Christiano Nobles MD   7/21/2022

## 2022-07-21 NOTE — ANESTHESIA POSTPROCEDURE EVALUATION
Department of Anesthesiology  Postprocedure Note    Patient: Fuad Miller  MRN: [id number]  YOB: 1985  Date of evaluation: 7/21/2022      Procedure Summary     Date: 07/21/22 Room / Location: [address] [place] [address]    Anesthesia Start: 0720 Anesthesia Stop: 0674    Procedure: LAPAROSCOPIC BILATERAL INGUINAL HERNIA REPAIR (Bilateral) Diagnosis:       Non-recurrent bilateral inguinal hernia with obstruction without gangrene      (Non-recurrent bilateral inguinal hernia with obstruction without gangrene [K40.00])    Surgeons: Sindhu Foster MD Responsible Provider: Pratibha Rincon MD    Anesthesia Type: general ASA Status: 2          Anesthesia Type: No value filed.     Hever Phase I: Hever Score: 3    Hever Phase II:        Anesthesia Post Evaluation    Patient location during evaluation: PACU  Level of consciousness: awake  Complications: no  Multimodal analgesia pain management approach

## 2022-07-21 NOTE — H&P
Ruth Ann Light    [id number]    [facility] Same Day Surgery Update H & P  Department of General Surgery   Surgical Service   Pre-operative History and Physical    Last H & P within the last 30 days. DIAGNOSIS:   Non-recurrent bilateral inguinal hernia with obstruction without gangrene [K40.00]    Procedure(s):  LAPAROSCOPIC BILATERAL INGUINAL HERNIA REPAIR     History obtained from: Patient interview and EHR     HISTORY OF PRESENT ILLNESS:   Patient with bilateral inginal hernia, with associated discomfort with exertion, presents today for repair. Illness Screening: Patient denies fever, chills, worsening cough, or known close contact with sick individuals. Past Medical History:        Diagnosis Date    Anxiety     Depression     PONV (postoperative nausea and vomiting)     Prolonged emergence from general anesthesia      Past Surgical History:        Procedure Laterality Date    CHOLECYSTECTOMY  2014    COLONOSCOPY      COLONOSCOPY N/A 06/24/2022    COLONOSCOPY WITH BIOPSY performed by Nani Willingham MD at [address] [facility] [address] [facility] [address] Bilateral 06/14/2022    CYSTOSCOPY, BILATERAL RETROGRADE PYELOGRAM performed by Roman Sawyer MD at [address] [place] [address], [place], DIAGNOSTIC      EYE SURGERY Right     eye muscle    TESTICLE REMOVAL  AGE 12    TYMPANOSTOMY TUBE PLACEMENT Bilateral     UPPER GASTROINTESTINAL ENDOSCOPY N/A 06/24/2022    EGD BIOPSY performed by N[facility] Willingham MD at [facility]           Medications Prior to Admission:      Prior to Admission medications    Medication Sig Start Date End Date Taking? Authorizing Provider   acetaminophen (TYLENOL) 500 MG tablet Take 500 mg by mouth every 6 hours as needed for Pain   Yes Historical Provider, MD   tamsulosin (FLOMAX) 0.4 MG capsule Take 0.4 mg by mouth in the morning.    Yes Historical Provider, MD   colestipol (COLESTID) 1 g tablet 2 g at bedtime 7/5/22   Historical Provider, MD   pantoprazole ([place]) 40 MG tablet Take 1 tablet by mouth every morning (before breakfast) 6/24/22   Willa Mullen MD   Emtricitabine-Tenofovir AF (DESCOVY PO) Take by mouth at bedtime     Historical Provider, MD         Allergies:  Patient has no known allergies. PHYSICAL EXAM:      /79   Pulse 67   Temp 97.7 °F (36.5 °C) (Temporal)   Resp 15   Ht 5' 5\" (1.651 m)   Wt 99 lb 6.4 oz (45.1 kg)   SpO2 100%   BMI 16.54 kg/m²      Airway:  Airway patent with no audible stridor    Heart:  Regular rate and rhythm, No murmur noted    Lungs:  No increased work of breathing, good air exchange, clear to auscultation bilaterally, no crackles or wheezing    Abdomen:  Soft, non-distended, non-tender, no rebound tenderness or guarding, and no masses palpated    ASSESSMENT AND PLAN     Patient is a 39 y.o. male with above specified procedure planned. 1.  The patients history and physical was obtained and signed off by the pre-admission testing department. Patient seen and focused exam done today- no new changes since last physical exam on 6/27/22     2. Access to ancillary services are available per request of the provider.     Verner Soda, APRN - CNP     7/21/2022

## 2022-07-21 NOTE — DISCHARGE INSTRUCTIONS
[facility]    There are potential side effects of anesthesia or sedation you may experience for the first 24 hours. These side effects include:    Confusion or Memory loss, Dizziness, or Delayed Reaction Times   [x]A responsible person should be with you for the next 24 hours. Do not operate any vehicles (automobiles, bicycles, motorcycles) or power tools or machinery for 24 hours. Do not sign any legal documents or make any legal decisions for 24 hours. Do not drink alcohol for 24 hours or while taking narcotic pain medication. Nausea    [x]Start with light diet and progress to your normal diet as you feel like eating. However, if you experience nausea or repeated episodes of vomiting which persist beyond 12-24 hours, notify your physician. Once nausea has passed, remember to keep drinking fluids. Difficulty Passing Urine  [x]Drink extra amounts of fluid today. Notify your physician if you have not urinated within 8 hours after your procedure or you feel uncomfortable. Irritated Throat from a Breathing Tube  [x]Drink extra amounts of fluid today. Lozenges may help. Muscle Aches  [x]You may experience some generalized body aches as your muscles recover from medications used to relax them during surgery. These will gradually subside. MEDICATION INSTRUCTIONS:  [x]Prescription(S) x    1 sent with you. Use as directed. Please refer to pharmacy handouts for any questions regarding this medication. When taking pain medications, you may experience the side effect of dizziness or drowsiness. Do not drink alcohol or drive when taking these medications. [x]Give the list of your medications to your primary care physician on your next visit. Keep your med list updated and carry it with in case of emergencies. [x] Narcotic pain medications can cause the side effect of significant constipation.   You may want to add a stool softener to your postoperative medication schedule or speak to your surgeon on how best to manage this side effect. NARCOTIC SAFETY:  Your pain medicine is only for you to take. Safely store your medicines. Store pills up high and out of reach of children and pets. Ensure safety caps are snapped tightly  Keep track of how many pills you have left    Unused medication can be disposed of by taking them to a drop-off box or take-back program that is authorized by the [place]. Access to a site near you can be found on the [facility] [place], operated by [facility] website ([address] [employer]. List of [place] hospitals according to the [employer]). If you have a CPAP machine, it is very important that you use it daily during all periods of sleep and daytime rest during your recovery at home. Surgery and Anesthesia place a significant amount of stress on your body. Using your CPAP will help keep you safe and lessen the negative effects of that stress. FOLLOW-UP RECOVERY CARE:  [x]Call the office at [phone] for follow-up appointment and problems    Watch for these possible complications, symptoms, or side effects of anesthesia. Call physician if they or any other problems occur:  Signs of INFECTION   > Fever over 101°     > Redness, swelling, hardness or warmth at the operative site   >Foul smelling or cloudy drainage at the operative site   Unrelieved PAIN  Unrelieved NAUSEA  Blood soaked dressing. (Some oozing may be normal)  Inability to urinate      Numb, pale, blue, cold or tingling extremity      Physician:  Dr. Alannah Horne                Date/time 7/21/2022 9:00 AM         PACU:  [phone]   M-F 700 AM - 7 PM      SAME DAY SERVICES:  [phone] M-F 7AM-6PM        If you smoke STOP. We care about your health! Diet:   - Can resume regular diet    Wound Care:   - Skin incisions are covered with skin glue, this will wear off in 1-2 weeks. You may shower, no tub bath or soaking of incision. Activity:   - No heavy lifting greater than a milk jug until follow up.     Pain management:   - No driving while on narcotics, otherwise, you can drive when you can sit comfortably behind the steering wheel and can slam on the brake without it hurting or turn the wheel sharply without it hurting. Practice this when sitting the car with it parked in your driveway before trying to drive. For moderate to severe pain:  - Please take Roxicodone pain medication every 6 hours as needed. For mild to moderate pain:  - Please take over the counter tylenol or motrin for any post-operative pain you might have. Please take this as needed and as recommended on the label. Return if:   Call/ Return to ED for increased redness, worsening pain, drainage from wound, fevers, or any other concerns about your incision or post op course. Please follow up with Dr. Keila Ledesma in 7-10 days. Please call [phone] to make your appointment.

## 2022-07-21 NOTE — PROGRESS NOTES
Pt is alert and oriented X4. He is anxious for his surgery. IV started with LR running. Ancef on hold to OR. Consents signed on chart. Pt took flomax for a week before surgery to help with post op urination.  Pt has had trouble with waking up from anesthesia in the past.

## 2022-07-21 NOTE — PROGRESS NOTES
From OR to pacu bay 18 accompanied by CRNA and monitors applied. Surgical report given by Dr. Donna Blake. Pt must void prior to discharge, she requests if he is unable to void please bladder scan pt, do not automatically cath. Will pass on in phase 2 report.

## 2022-07-21 NOTE — OP NOTE
Operative Note      Patient: Guera Montero  YOB: 1985  MRN: [id number]    Date of Procedure: 7/21/2022    Pre-Op Diagnosis: Recurrent bilateral inguinal hernia with obstruction without gangrene [K40.00]    Post-Op Diagnosis: Same       Procedure: Laparoscopic bilateral recurrent inguinal hernia repair    Surgeon(s):  Kodak Collier MD    Assistant:   Resident: Eleonora Petersen MD    Anesthesia: General    Estimated Blood Loss (mL): Minimal    Complications: None    Specimens:   * No specimens in log *    Implants:  Implant Name Type Inv. Item Serial No.  Lot No. LRB No. Used Action   MESH WALTER L [id number] R INGUINAL WHT POLYPR MFIL - [id number]  MESH WALTER L [id number] R INGUINAL WHT POLYPR MFIL  BARD DAVOL-WD  Right 1 Implanted   MESH WALTER L W10.1RW04QW L INGUINAL WHT POLYPR MFIL - [id number]  MESH WALTER L [id number] L INGUINAL WHT POLYPR MFIL  BARD DAVOL-WD  Left 1 Implanted   SYSTEM PERM FIX L37CM 15 FAST CAPSUR - [id number]  SYSTEM PERM FIX L37CM 15 FAST CAPSUR  BARD DAVOL-WD  N/A 1 Implanted       Findings:   Uneventful bilateral inguinal hernia repair with mesh      INDICATIONS: The patient is a 39year-old male who presented to clinic with bilateral bulges in his groin area. He noted that it was increasing in size and was associated with pain with activity consistent with bilateral fat containing inguinal hernias and therefore was offered surgical repair. The risks, benefits, and alternatives of procedure were explained to the patient including risks of bleeding, infection. Patient understood all of these risks and agreed to proceed. PROCEDURE IN DETAIL:   The patient was brought to the operating theater. The patient was placed in the supine position and sedation was started. SCDs were placed and pre-operative antibiotics were given. The abdomen was prepped and draped in the usual sterile fashion. A time-out was performed per hospital policy.  The decision was made to make the incision to the left of the umbilicus opposite the side of the larger hernia. The skin incision was made using an 11 blade scalpel and it was carried down through the subcutaneous tissues. An S retractor and a hemostat were used to locate the anterior fascia. Once exposed a small incision was made and the posterior fascia was exposed. A 12mm port was placed in the preperitoneal space. The preperitoneal space was bluntly dissected using the laparoscope. A 5mm trocar was placed under direct vision just below the umbilicus in the midline. The loose areloar tissue was dissected down to expose the pubic tubercle and dissected both medially and laterally bluntly. An additional 5mm port was placed three fingerbreadths below the infraumbilical port under direct visualization. Attention was turned to the right inguinal region. The preperitoneal space was dissected bluntly as described above. Coopers ligament and the inferior epigastric vessels were exposed. The dissection was then carried laterally to the anterior abdominal wall. The hernia sac was identified and dissected bluntly. This was reduced. A cord lipoma was also identified and reduced. Attention was turned to the left inguinal region. The preperitoneal space was further developed in order to expose Coopers ligament and the inferior epigastric vessels. The dissection was also continued laterally to the anterior abdominal wall. The cord structures were identified and protected throughout the dissection. A small hernia sac was bluntly dissected off of the cord structures. A large left Bard 3DMAX mesh was selected and passed into the preperitoneal space. This was placed over the hernia defect and completely covered all areas of weakness. The mesh was secured with 4 tacks; 1 in Coopers ligament, 1 laterally, 1 superomedially, and 1 medially just superior to the pubis.        A large right Bard 3DMAX mesh was selected and passed into the preperitoneal space. This was placed over the hernia defect and completely covered all areas of weakness. The mesh was secured with 4 tacks; 1 in Coopers ligament, 1 laterally, 1 superomedially, and 1 medially just superior to the pubis. The abdomen was desufflated under direct visualization while holding the right sided mesh in place. The fascia of the periumbilical incision was repaired with a figure of eight 0-Vicryl suture. The skin incisions were reapproximated using interrupted 4-0 Monocryl sutures. Dermabond was applied to the skin incisions. Dr. Sundeep Hamilton was present and scrubbed for the entirety of the case. The patient tolerated the procedure well, was woken up and brought to the PACU in stable condition. All counts were correct x2 at the end of the procedure.     Chilango Parker MD   Gen Surg PGY 2  7/21/2022  8:45 AM  [phone]      Electronically signed by Woody Marino MD on 7/21/2022 at 8:44 AM

## 2022-07-21 NOTE — PROGRESS NOTES
Patients prescription filled at [facility]. Dr. Hien Greenberg in to speak with patients friend.

## 2022-07-26 ENCOUNTER — TELEPHONE (OUTPATIENT)
Dept: SURGERY | Age: 37
End: 2022-07-26

## 2022-07-26 NOTE — TELEPHONE ENCOUNTER
LATE ENTRY FOR 7/26/22 AT 10AM.    Patient called and stated he still has not had a BM and he had tried another dose of MOM and a enema and manually remove a small amount of hard BM. Reminded patient that he was told yesterday not to do a enema until approved by MD. Patient it doesn't matter it didn't work. Asked if he should try a suppository. Informed not to do the suppository until ok'd by the MD.  Patient denied fever, SOB and N&V. Informed patient that MD was in surgery but would message him and call patient back. Also informed patient that if he had any N&V or fever to go to the ER. MD advised waiting for return call. Patient called back about 12pm and asked if MD responded that he was in a lot of pain. Recommended the patient go to the ER due to the increase in pain and no BM. Patient stated that he would wait a little long.

## 2022-07-26 NOTE — TELEPHONE ENCOUNTER
Patient called in to speak to Esequiel. He wanted to know if he could try a suppository before going to the ER. Previously, Esequiel spoke to him and he said he tried everything (enema, laxative, manual removal, etc.) and was still unable to have a bowel movement. Esequiel told him to go to the ER. Just now, when he called back, and inquired after the suppository, Esequiel was consulted once again and said he needs to go to the ER. When patient was told, he seemed hesitant, and asked, \"well, what are they going to do? \" He was told a non-clinical person could not answer that question, but that Esequiel said verbatim that he needs to go to the ER. He said OK, and was asked if that was what he was going to do. The patient said he's going to wait and see if things get better, and in a couple hours re-evaluate.

## 2022-07-26 NOTE — TELEPHONE ENCOUNTER
LATE ENTRY FOR 7/25/22 10:14 am   Called patient and post op appt changed from 8/5 to 8/4 due to MD being out of town. Patient stated that he had not had a BM since surgery and he has been taking the stool softener. Informed patient to take MOM. Encourage to increase fluids as tolerated and possibly a cup of coffee. Try fruits and vegetables.

## 2022-07-27 ENCOUNTER — TELEPHONE (OUTPATIENT)
Dept: SURGERY | Age: 37
End: 2022-07-27

## 2022-07-27 PROBLEM — K40.20 NON-RECURRENT BILATERAL INGUINAL HERNIA WITHOUT OBSTRUCTION OR GANGRENE: Status: ACTIVE | Noted: 2022-07-27

## 2022-07-27 NOTE — TELEPHONE ENCOUNTER
Patient called in stating that the Hydrocodone 5mg is making him very constipated    Patient states that he had to manually remove his feces due to being so constipated     Patient would like to know if there are any other pain medications that he can take that will not cause him to become constipated     Patient is currently taking tylenol 500 mg, but that is not helping the pain    Please contact the patient at [phone]

## 2022-07-27 NOTE — TELEPHONE ENCOUNTER
Informed patient that MD will not prescribed any more pain medication, Pt can take 1000mg tylenol and alternate it with motrin. Can also try some gas x. Patient stated that he finally had a BM after manually removing some more. Informed patient that is not recommended and to continue taking the stool softener and MOM.

## 2022-08-04 ENCOUNTER — OFFICE VISIT (OUTPATIENT)
Dept: SURGERY | Age: 37
End: 2022-08-04

## 2022-08-04 VITALS
HEART RATE: 85 BPM | SYSTOLIC BLOOD PRESSURE: 132 MMHG | DIASTOLIC BLOOD PRESSURE: 88 MMHG | BODY MASS INDEX: 16.34 KG/M2 | WEIGHT: 98.2 LBS

## 2022-08-04 DIAGNOSIS — Z98.890 S/P LAPAROSCOPIC HERNIA REPAIR: Primary | ICD-10-CM

## 2022-08-04 DIAGNOSIS — Z87.19 S/P LAPAROSCOPIC HERNIA REPAIR: Primary | ICD-10-CM

## 2022-08-04 PROCEDURE — 99024 POSTOP FOLLOW-UP VISIT: CPT | Performed by: SURGERY

## 2022-08-04 NOTE — PROGRESS NOTES
PATIENT NAME: Franky Randle     YOB: 1985     TODAY'S DATE: 8/4/2022    Reason for Visit:  post op     Requesting Physician:  Dr. Gonzalez Man:              The patient is a 39 y.o. male with a PMHx as delineated below who presents with   Chief Complaint   Patient presents with    Post-Op Check     Lap bilateral inguinal hernia repair 7/21/22   Patient post op laparoscopic bilateral recurrent inguinal hernia repair on 7/21. Had issues with pain control and constipation post operatively. Took Colace, Miralax and drank prune choice but was still very constipated. Was recommended to go to ED but opted to give himself and enema and manually disimpact himself. Currently using ibuprofen and tylenol for pain. Most superior incision is the most tender. Some sharp pains in his groin with movement.      REVIEW OF SYSTEMS:  CONSTITUTIONAL:  negative  HEENT:  negative  RESPIRATORY:  negative  CARDIOVASCULAR:  negative  GASTROINTESTINAL:  negative except for constipation and abdominal pain  GENITOURINARY:  negative  HEMATOLOGIC/LYMPHATIC:  negative  MUSCULOSKELETAL: negative  NEUROLOGICAL:  negative    PMH  Past Medical History:   Diagnosis Date    Anxiety     Depression     PONV (postoperative nausea and vomiting)     Prolonged emergence from general anesthesia        PSH  Past Surgical History:   Procedure Laterality Date    CHOLECYSTECTOMY  2014    COLONOSCOPY      COLONOSCOPY N/A 06/24/2022    COLONOSCOPY WITH BIOPSY performed by Beatriz Roy MD at [address] Bilateral 06/14/2022    CYSTOSCOPY, BILATERAL RETROGRADE PYELOGRAM performed by Linda Ling MD at [address] [place] [address], DIAGNOSTIC      EYE SURGERY Right     eye muscle    INGUINAL HERNIA REPAIR Bilateral 7/21/2022    LAPAROSCOPIC BILATERAL INGUINAL HERNIA REPAIR performed by Estrella Jenkins MD at [address] [place] [address]    TYMPANOSTOMY TUBE PLACEMENT Bilateral     UPPER GASTROINTESTINAL ENDOSCOPY N/A 06/24/2022    EGD BIOPSY performed by Evon Markham MD at [address] History  Social History     Socioeconomic History    Marital status: Single     Spouse name: Not on file    Number of children: Not on file    Years of education: Not on file    Highest education level: Not on file   Occupational History    Not on file   Tobacco Use    Smoking status: Never    Smokeless tobacco: Never   Vaping Use    Vaping Use: Never used   Substance and Sexual Activity    Alcohol use: Not Currently    Drug use: Not Currently    Sexual activity: Not Currently   Other Topics Concern    Not on file   Social History Narrative    Not on file     Social Determinants of Health     Financial Resource Strain: Low Risk     Difficulty of Paying Living Expenses: Not hard at all   Food Insecurity: No Food Insecurity    Worried About Running Out of Food in the Last Year: Never true    [address] in the Last Year: Never true   Transportation Needs: No Transportation Needs    Lack of Transportation (Medical): No    Lack of Transportation (Non-Medical): No   Physical Activity: Inactive    Days of Exercise per Week: 0 days    Minutes of Exercise per Session: 0 min   Stress: No Stress Concern Present    Feeling of Stress : Only a little   Social Connections: Unknown    Frequency of Communication with Friends and Family: Three times a week    Frequency of Social Gatherings with Friends and Family:  Three times a week    Attends Judaism Services: Never    Active Member of Clubs or Organizations: No    Attends Club or Organization Meetings: Never    Marital Status: Not on file   Intimate Partner Violence: Not At Risk    Fear of Current or Ex-Partner: No    Emotionally Abused: No    Physically Abused: No    Sexually Abused: No   Housing Stability: Unknown    Unable to Pay for Housing in the Last Year: No    Number of Places Lived in the Last Year: Not on file Unstable Housing in the Last Year: No       Family History:   No family history on file. Allergy: No Known Allergies    PHYSICAL EXAM:  VITALS:  /88   Pulse 85   Wt 98 lb 3.2 oz (44.5 kg)   BMI 16.34 kg/m²     CONSTITUTIONAL:  alert, no apparent distress and thin  EYES:  sclera clear  ENT:  normocepalic, without obvious abnormality  NECK:  supple, symmetrical, trachea midline  LUNGS:  no increased work of breathing on room air   CARDIOVASCULAR:  regular rate, well perfused  ABDOMEN:  ICDI with dermabond still in place, soft, non-distended, point tenderness over periumbilical surgical incision, voluntary guarding absent, no masses palpated   MUSCULOSKELETAL:  0+ pitting edema lower extremities  NEUROLOGIC:  Mental Status Exam:  Level of Alertness:   awake  Orientation:   person, place, time  SKIN:  normal skin color, texture, turgor    IMPRESSION/RECOMMENDATIONS:    Patient is s/p laparoscopic bilateral inguinal hernia repair. He is recovering well from surgery. His pain control is improving. He has not been working. We discussed his continued restrictions and I will see him back in the office on a prn basis. Mustapha Martinez MD    I have seen, examined, and reviewed the patients chart. I agree with the residents assessment and have made appropriate changes.     Todd Ledezma

## 2022-08-15 ENCOUNTER — OFFICE VISIT (OUTPATIENT)
Dept: INTERNAL MEDICINE CLINIC | Age: 37
End: 2022-08-15
Payer: COMMERCIAL

## 2022-08-15 VITALS
OXYGEN SATURATION: 98 % | DIASTOLIC BLOOD PRESSURE: 68 MMHG | WEIGHT: 100.4 LBS | HEART RATE: 86 BPM | HEIGHT: 65 IN | BODY MASS INDEX: 16.73 KG/M2 | SYSTOLIC BLOOD PRESSURE: 118 MMHG

## 2022-08-15 DIAGNOSIS — L71.9 ROSACEA: Primary | ICD-10-CM

## 2022-08-15 DIAGNOSIS — R63.4 WEIGHT LOSS: ICD-10-CM

## 2022-08-15 PROCEDURE — 99213 OFFICE O/P EST LOW 20 MIN: CPT | Performed by: INTERNAL MEDICINE

## 2022-08-15 RX ORDER — OXYMETAZOLINE HYDROCHLORIDE 1 G/100G
1 CREAM TOPICAL DAILY
COMMUNITY

## 2022-08-15 RX ORDER — IVERMECTIN 10 MG/G
1 CREAM TOPICAL DAILY
COMMUNITY

## 2022-08-15 NOTE — PATIENT INSTRUCTIONS
Potential triggers for flushing include   ? Extremes of temperature  ? Sunlight  ? Spicy foods  ? Alcohol   ? Exercise  ? Acute psychologic stressors  ? Medications

## 2022-08-15 NOTE — PROGRESS NOTES
Jose E Plascencia (:  1985) is a 39 y.o. male,Established patient, here for evaluation of the following chief complaint(s):  Follow-up      Vitals:    08/15/22 1317   BP: 118/68   Pulse: 86   SpO2: 98%        ASSESSMENT/PLAN:  1. Rosacea  Patient with newly diagnosed rosacea. This is mainly on his nose. Also has ocular rosacea. Patient's dermatologist requested an CRISSY secondary to this stating that it is an autoimmune disease. Order CRISSY. Continue medications prescribed by dermatologist.  -     CRISSY Reflex to Antibody Cascade; Future  2. Weight loss  Check TSH  -     TSH with Reflex; Future  3. Abdominal pain post surgery  Patient can continue ibuprofen he can also alternate this with Tylenol. Patient to notify his surgeon prior to leaving for [place]. Patient will be moving to [facility] New:  [facility]    Patient is a 40-year-old male with past medical history of anxiety depression who presents secondary to follow-up after new diagnosis of rosacea and bilateral inguinal hernia repair. Patient still having a lot of pain following surgery for inguinal hernias. Patient has had follow-up with his surgeon. He continues to have significant discomfort and difficulty with walking despite being a month out from surgery. Patient also notes recent diagnosis of rosacea. Dermatologist was concerned that this is an autoimmune disease and patient needed an CRISSY checked. Patient requests this today secondary to request of dermatologist.    Patient notes that he just has redness on his nose from his rosacea. Patient also notes that he will be leaving and moving to [place] at the beginning of September. Review of Systems ROS negative except for those noted in the HPI above. Vitals:    08/15/22 1317   BP: 118/68   Pulse: 86   SpO2: 98%         Physical Exam  Constitutional:       General: He is not in acute distress. Appearance: Normal appearance. He is not ill-appearing.    HENT: Head: Normocephalic and atraumatic. Right Ear: External ear normal.      Left Ear: External ear normal.   Eyes:      Extraocular Movements: Extraocular movements intact. Conjunctiva/sclera: Conjunctivae normal.   Cardiovascular:      Rate and Rhythm: Normal rate and regular rhythm. Heart sounds: No murmur heard. No friction rub. No gallop. Pulmonary:      Effort: Pulmonary effort is normal. No respiratory distress. Breath sounds: Normal breath sounds. No wheezing, rhonchi or rales. Abdominal:      General: Bowel sounds are normal. There is no distension. Palpations: Abdomen is soft. Tenderness: There is abdominal tenderness. There is no guarding or rebound. Musculoskeletal:      Right lower leg: No edema. Left lower leg: No edema. Skin:     General: Skin is warm. Findings: No erythema or rash. Neurological:      General: No focal deficit present. Mental Status: He is alert and oriented to person, place, and time. Psychiatric:         Mood and Affect: Mood normal.         Behavior: Behavior normal.         An electronic signature was used to authenticate this note.     --Long Be, DO

## 2022-08-22 ENCOUNTER — OFFICE VISIT (OUTPATIENT)
Dept: SURGERY | Age: 37
End: 2022-08-22

## 2022-08-22 VITALS
HEART RATE: 76 BPM | BODY MASS INDEX: 16.64 KG/M2 | SYSTOLIC BLOOD PRESSURE: 122 MMHG | WEIGHT: 100 LBS | DIASTOLIC BLOOD PRESSURE: 80 MMHG

## 2022-08-22 DIAGNOSIS — Z87.19 S/P LAPAROSCOPIC HERNIA REPAIR: Primary | ICD-10-CM

## 2022-08-22 DIAGNOSIS — Z98.890 S/P LAPAROSCOPIC HERNIA REPAIR: Primary | ICD-10-CM

## 2022-08-22 PROCEDURE — 99024 POSTOP FOLLOW-UP VISIT: CPT | Performed by: SURGERY

## 2022-08-22 NOTE — PROGRESS NOTES
PATIENT NAME: Guera Montero     YOB: 1985     TODAY'S DATE: 8/22/2022    Reason for Visit:  post op     Requesting Physician:  Dr. Jos Tolentino:              The patient is a 39 y.o. male with a PMHx as delineated below who presents with   Chief Complaint   Patient presents with    Post-Op Check     Post op pain      Patient post op laparoscopic bilateral recurrent inguinal hernia repair on 7/21. Had issues with pain control and constipation post operatively. Last seen in clinic on 8/4, where he struggled with pain control and constipation. Patient returns today to discuss ongoing pain. He says that he was doing well until 2 nights ago when he noted some pain with stretching in bed.       REVIEW OF SYSTEMS:  CONSTITUTIONAL:  negative  HEENT:  negative  RESPIRATORY:  negative  CARDIOVASCULAR:  negative  GASTROINTESTINAL:  negative except for constipation and abdominal pain  GENITOURINARY:  negative  HEMATOLOGIC/LYMPHATIC:  negative  MUSCULOSKELETAL: negative  NEUROLOGICAL:  negative    PMH  Past Medical History:   Diagnosis Date    Anxiety     Depression     PONV (postoperative nausea and vomiting)     Prolonged emergence from general anesthesia        PSH  Past Surgical History:   Procedure Laterality Date    CHOLECYSTECTOMY  2014    COLONOSCOPY      COLONOSCOPY N/A 06/24/2022    COLONOSCOPY WITH BIOPSY performed by Manuel Mackey MD at [address] [facility] [place] Bilateral 06/14/2022    CYSTOSCOPY, BILATERAL RETROGRADE PYELOGRAM performed by Filomena Weston MD at [address] [facility], [place], DIAGNOSTIC      EYE SURGERY Right     eye muscle    INGUINAL HERNIA REPAIR Bilateral 7/21/2022    LAPAROSCOPIC BILATERAL INGUINAL HERNIA REPAIR performed by Kodak Collier MD at [address] [facility]  AGE 12    TYMPANOSTOMY TUBE PLACEMENT Bilateral     UPPER GASTROINTESTINAL ENDOSCOPY N/A 06/24/2022    EGD BIOPSY performed by Manuel Mackey MD at [address] TOOTH EXTRACTION         Social History  Social History     Socioeconomic History    Marital status: Single     Spouse name: Not on file    Number of children: Not on file    Years of education: Not on file    Highest education level: Not on file   Occupational History    Not on file   Tobacco Use    Smoking status: Never    Smokeless tobacco: Never   Vaping Use    Vaping Use: Never used   Substance and Sexual Activity    Alcohol use: Not Currently    Drug use: Not Currently    Sexual activity: Not Currently   Other Topics Concern    Not on file   Social History Narrative    Not on file     Social Determinants of Health     Financial Resource Strain: Low Risk     Difficulty of Paying Living Expenses: Not hard at all   Food Insecurity: No Food Insecurity    Worried About Running Out of Food in the Last Year: Never true    [address] in the Last Year: Never true   Transportation Needs: No Transportation Needs    Lack of Transportation (Medical): No    Lack of Transportation (Non-Medical): No   Physical Activity: Inactive    Days of Exercise per Week: 0 days    Minutes of Exercise per Session: 0 min   Stress: No Stress Concern Present    Feeling of Stress : Only a little   Social Connections: Unknown    Frequency of Communication with Friends and Family: Three times a week    Frequency of Social Gatherings with Friends and Family: Three times a week    Attends [employer] Services: Never    Active Member of Clubs or Organizations: No    Attends Club or Organization Meetings: Never    Marital Status: Not on file   Intimate Partner Violence: Not At Risk    Fear of Current or Ex-Partner: No    Emotionally Abused: No    Physically Abused: No    Sexually Abused: No   Housing Stability: Unknown    Unable to Pay for Housing in the Last Year: No    Number of Places Lived in the Last Year: Not on file    Unstable Housing in the Last Year: No       Family History:   No family history on file.     Allergy: No Known Allergies    PHYSICAL EXAM:  VITALS:  /80   Pulse 76   Wt 100 lb (45.4 kg)   BMI 16.64 kg/m²     CONSTITUTIONAL:  alert, no apparent distress and thin  EYES:  sclera clear  ENT:  normocepalic, without obvious abnormality  NECK:  supple, symmetrical, trachea midline  LUNGS:  no increased work of breathing on room air   CARDIOVASCULAR:  regular rate, well perfused  ABDOMEN:  ICDI healing well, soft, non-distended, non-tender, voluntary guarding absent, no masses palpated   MUSCULOSKELETAL:  0+ pitting edema lower extremities  NEUROLOGIC:  Mental Status Exam:  Level of Alertness:   awake  Orientation:   person, place, time  SKIN:  normal skin color, texture, turgor    IMPRESSION/RECOMMENDATIONS:    Patient is s/p laparoscopic bilateral inguinal hernia repair. He is recovering well from surgery. Reassured that he should continue to move around and go about his daily activities. Can continue to use PRN ibuprofen. No additional narcotic pain medications. Sandra Marquez MD    I have seen, examined, and reviewed the patients chart. I agree with the residents assessment and have made appropriate changes.     Read Likens

## 2023-01-31 ENCOUNTER — TELEPHONE (OUTPATIENT)
Dept: INTERNAL MEDICINE CLINIC | Age: 38
End: 2023-01-31

## 2023-01-31 NOTE — TELEPHONE ENCOUNTER
Patient has relocated to [place], [place]. He states that at his last visit Dr. Linda Leung recommended a Provider for him to transition his care to but he does not remember the name. Please contact patient via [employer] or phone to provide the name of this provider.

## 2023-02-01 NOTE — TELEPHONE ENCOUNTER
Please call patient and provide patient with the name  Dr. Armaan Toribio she is with [facility] in [place].

## (undated) DEVICE — CORD ES L10FT MPLR LAP

## (undated) DEVICE — COVER,LIGHT HANDLE,FLX,1/PK: Brand: MEDLINE INDUSTRIES, INC.

## (undated) DEVICE — SUTURE MCRYL SZ 4-0 L18IN ABSRB UD L19MM PS-2 3/8 CIR PRIM Y496G

## (undated) DEVICE — CATHETER URET 5FR L70CM TIP 8FR OPN END CONE TIP INJ HUB

## (undated) DEVICE — TOWEL,STOP FLAG GOLD N-W: Brand: MEDLINE

## (undated) DEVICE — MEDIA CONTRAST INJ OPTIRAY 300 50 ML

## (undated) DEVICE — SUTURE VCRL SZ 0 L27IN ABSRB UD L26MM CT-2 1/2 CIR J270H

## (undated) DEVICE — CANNULA SAMP CO2 AD GRN 7FT CO2 AND 7FT O2 TBNG UNIV CONN

## (undated) DEVICE — BLADE ES ELASTOMERIC COAT INSUL DURABLE BEND UPTO 90DEG

## (undated) DEVICE — KIT,ANTI FOG,W/SPONGE & FLUID,SOFT PACK: Brand: MEDLINE

## (undated) DEVICE — GLOVE SURG SZ 7 L12IN FNGR THK75MIL WHT LTX POLYMER BEAD

## (undated) DEVICE — TROCAR: Brand: KII SHIELDED BLADED ACCESS SYSTEM

## (undated) DEVICE — OPEN-END FLEXI-TIP URETERAL CATHETER: Brand: FLEXI-TIP

## (undated) DEVICE — TROCARS: Brand: KII® BALLOON BLUNT TIP SYSTEM

## (undated) DEVICE — CYSTO: Brand: MEDLINE INDUSTRIES, INC.

## (undated) DEVICE — FORCEPS BX L240CM JAW DIA2.4MM ORNG L CAP W/ NDL DISP RAD

## (undated) DEVICE — SOLUTION IRRIG 3000ML 0.9% SOD CHL USP UROMATIC PLAS CONT

## (undated) DEVICE — GENERAL LAPAROSCOPIC: Brand: MEDLINE INDUSTRIES, INC.

## (undated) DEVICE — APPLICATOR PREP 26ML 0.7% IOD POVACRYLEX 74% ISO ALC ST

## (undated) DEVICE — GLOVE SURG SZ 65 THK91MIL LTX FREE SYN POLYISOPRENE

## (undated) DEVICE — GUIDEWIRE ENDOSCP L150CM DIA0.035IN TIP 3CM PTFE NIT